# Patient Record
Sex: MALE | Race: WHITE | Employment: OTHER | URBAN - METROPOLITAN AREA
[De-identification: names, ages, dates, MRNs, and addresses within clinical notes are randomized per-mention and may not be internally consistent; named-entity substitution may affect disease eponyms.]

---

## 2017-12-06 ENCOUNTER — HOSPITAL ENCOUNTER (EMERGENCY)
Age: 67
Discharge: HOME | End: 2017-12-06
Attending: EMERGENCY MEDICINE
Payer: MEDICARE

## 2017-12-06 VITALS
OXYGEN SATURATION: 97 % | SYSTOLIC BLOOD PRESSURE: 147 MMHG | HEIGHT: 66 IN | HEART RATE: 78 BPM | TEMPERATURE: 98.4 F | WEIGHT: 181 LBS | RESPIRATION RATE: 20 BRPM | DIASTOLIC BLOOD PRESSURE: 89 MMHG | BODY MASS INDEX: 29.09 KG/M2

## 2017-12-06 DIAGNOSIS — B96.89 ACUTE BACTERIAL SINUSITIS: Primary | ICD-10-CM

## 2017-12-06 DIAGNOSIS — J01.90 ACUTE BACTERIAL SINUSITIS: Primary | ICD-10-CM

## 2017-12-06 PROCEDURE — 99283 EMERGENCY DEPT VISIT LOW MDM: CPT

## 2017-12-06 RX ORDER — IBUPROFEN 600 MG/1
600 TABLET ORAL EVERY 8 HOURS PRN
Qty: 20 TABLET | Refills: 0 | Status: SHIPPED | OUTPATIENT
Start: 2017-12-06

## 2017-12-06 RX ORDER — AZITHROMYCIN 250 MG/1
TABLET, FILM COATED ORAL
Qty: 1 PACKET | Refills: 0 | Status: SHIPPED | OUTPATIENT
Start: 2017-12-06 | End: 2017-12-16

## 2017-12-06 RX ORDER — FLUTICASONE PROPIONATE 50 MCG
1 SPRAY, SUSPENSION (ML) NASAL DAILY
Qty: 1 BOTTLE | Refills: 0 | Status: SHIPPED | OUTPATIENT
Start: 2017-12-06 | End: 2017-12-20

## 2017-12-06 RX ORDER — LORATADINE 10 MG/1
10 TABLET ORAL DAILY
Qty: 14 TABLET | Refills: 0 | Status: SHIPPED | OUTPATIENT
Start: 2017-12-06

## 2017-12-06 ASSESSMENT — ENCOUNTER SYMPTOMS
SORE THROAT: 0
SHORTNESS OF BREATH: 0
RHINORRHEA: 1
COUGH: 0
SINUS PAIN: 1

## 2017-12-06 ASSESSMENT — PAIN DESCRIPTION - PAIN TYPE: TYPE: ACUTE PAIN

## 2017-12-06 ASSESSMENT — PAIN DESCRIPTION - LOCATION: LOCATION: HEAD

## 2017-12-06 ASSESSMENT — PAIN SCALES - GENERAL: PAINLEVEL_OUTOF10: 6

## 2017-12-06 NOTE — ED TRIAGE NOTES
Pt presents to ED via private auto with c/o cough, sinus congestion/pressure. Respirations even and unlabored with clear lung sounds throughout all fields with green mucous production. Denies fever, chest pain, SOB, N/V/D, blurry vision and dizziness at this time. Pt is alert and oriented x 4 with no signs of distress noted.   Pt is resting on cot

## 2017-12-06 NOTE — ED PROVIDER NOTES
[address] [place] [address] ED  eMERGENCY dEPARTMENT eNCOUnter      Pt Name: Estefany Corrales  MRN: [id number]  [place] 1950  Date of evaluation: 12/6/2017  Provider: Yoan Perez NP    [address] [place] [address]       Chief Complaint   Patient presents with    Headache    Sinusitis    Nasal Congestion    Cough         HISTORY OF PRESENT ILLNESS  (Location/Symptom, Timing/Onset, Context/Setting, Quality, Duration, Modifying Factors, Severity.)   Estefany Corrales is a 78 y.o. male who presents to the emergency department by private auto for evaluation of sinus pain, headache, rhinorrhea and nasal congestion that started 3 days ago. Patient states his head hurts all over. His pain as a 6 out of 10 and describes a pressure in his head. He denies nausea, vomiting, visual changes or dizziness. Patient has been taking over-the-counter medicine for his symptoms. Nursing Notes were reviewed. PAST MEDICAL HISTORY     Past Medical History:   Diagnosis Date    Hyperlipidemia     Hypertension     Parkinson's disease ([facility] [address]          SURGICAL HISTORY       Past Surgical History:   Procedure Laterality Date    TESTICLE BIOPSY           CURRENT MEDICATIONS       Discharge Medication List as of 12/6/2017  6:33 PM      CONTINUE these medications which have NOT CHANGED    Details   ondansetron (ZOFRAN ODT) 4 MG disintegrating tablet Take 1 tablet by mouth every 8 hours as needed for Nausea or Vomiting, Disp-20 tablet, R-0      lisinopril (PRINIVIL;ZESTRIL) 2.5 MG tablet Take 2.5 mg by mouth daily. carbidopa-levodopa (SINEMET)  MG per tablet Take 1 tablet by mouth 3 times daily. simvastatin (ZOCOR) 20 MG tablet Take 20 mg by mouth nightly. finasteride (PROPECIA) 1 MG tablet Take 1 mg by mouth daily. ALLERGIES     Pcn [penicillins]    FAMILY HISTORY     No family history on file.        SOCIAL HISTORY       Social History     Social History    Marital status:      Spouse name: N/A    Number of children: N/A    Years of education: N/A     Social History Main Topics    Smoking status: Never Smoker    Smokeless tobacco: Not on file    Alcohol use No    Drug use: No    Sexual activity: Not on file     Other Topics Concern    Not on file     Social History Narrative    No narrative on file         REVIEW OF SYSTEMS    (2-9 systems for level 4, 10 or more for level 5)     Review of Systems   HENT: Positive for congestion, rhinorrhea and sinus pain. Negative for ear pain and sore throat. Respiratory: Negative for cough and shortness of breath. Cardiovascular: Negative for chest pain. All other systems reviewed and are negative. Except as noted above the remainder of the review of systems was reviewed and negative. PHYSICAL EXAM    (up to 7 for level 4, 8 or more for level 5)     ED Triage Vitals [12/06/17 1818]   BP Temp Temp src Pulse Resp SpO2 Height Weight   (!) 147/89 98.4 °F (36.9 °C) -- 78 20 97 % 5' 6\" (1.676 m) 181 lb (82.1 kg)       Physical Exam   Constitutional: He is oriented to person, place, and time. He appears well-developed and well-nourished. HENT:   Head: Normocephalic and atraumatic. Right Ear: Hearing, tympanic membrane, external ear and ear canal normal.   Left Ear: Hearing, tympanic membrane, external ear and ear canal normal.   Nose: Mucosal edema and rhinorrhea present. Right sinus exhibits frontal sinus tenderness. Left sinus exhibits frontal sinus tenderness. Mouth/Throat: Uvula is midline, oropharynx is clear and moist and mucous membranes are normal.   Eyes: Conjunctivae and EOM are normal. Pupils are equal, round, and reactive to light. Neck: Normal range of motion. Neck supple. Pulmonary/Chest: Effort normal and breath sounds normal.   Musculoskeletal: Normal range of motion. Lymphadenopathy:     He has no cervical adenopathy. Neurological: He is alert and oriented to person, place, and time. He has normal strength and normal reflexes.  No cranial nerve deficit or sensory deficit. Skin: Skin is warm, dry and intact. Psychiatric: He has a normal mood and affect. His speech is normal and behavior is normal. Judgment and thought content normal. Cognition and memory are normal.         DIAGNOSTIC RESULTS     EKG: All EKG's are interpreted by the Emergency Department Physician who either signs or Co-signs this chart in the absence of a cardiologist.        RADIOLOGY:   Non-plain film images such as CT, Ultrasound and MRI are read by the radiologist. Plain radiographic images are visualized and preliminarily interpreted by the emergency physician with the below findings:        Interpretation per the Radiologist below, if available at the time of this note:    No orders to display         ED BEDSIDE ULTRASOUND:   Performed by ED Physician - none    LABS:  Labs Reviewed - No data to display    All other labs were within normal range or not returned as of this dictation. EMERGENCY DEPARTMENT COURSE and DIFFERENTIAL DIAGNOSIS/MDM:   Patient was evaluated in conjunction with attending physician. Patient presents with nasal congestion, rhinorrhea and sinus pain. He is afebrile. Lung sounds are clear to auscultation. No chest pain or shortness of breath. No neurological deficits. He has a penicillin allergy. He'll be placed on Zithromax for treatment of sinusitis. Flonase, Claritin and Motrin for symptom management. Patient instructed to call number provided to schedule appointment with a primary care physician. Return ED if symptoms worsen. Vitals:    Vitals:    12/06/17 1818   BP: (!) 147/89   Pulse: 78   Resp: 20   Temp: 98.4 °F (36.9 °C)   SpO2: 97%   Weight: 181 lb (82.1 kg)   Height: 5' 6\" (1.676 m)         CONSULTS:  None    PROCEDURES:  Procedures    FINAL IMPRESSION      1.  Acute bacterial sinusitis          DISPOSITION/PLAN   DISPOSITION Decision to Discharge    PATIENT REFERRED TO:     Call [phone] to schedule appointment with a

## 2017-12-06 NOTE — ED PROVIDER NOTES
Attending Supervisory Note/Shared Visit   I have personally performed a face to face diagnostic evaluation on this patient. I have reviewed the mid-levels findings and agree.   History and Exam by me shows acute sinusitis      (Please note that portions of this note were completed with a voice recognition program.  Efforts were made to edit the dictations but occasionally words are mis-transcribed.)    Ronny Olivera MD  Attending Emergency Physician       Ronny Olivera MD  12/06/17 [id number]

## 2021-12-15 ENCOUNTER — APPOINTMENT (OUTPATIENT)
Dept: GENERAL RADIOLOGY | Age: 71
End: 2021-12-15
Payer: MEDICARE

## 2021-12-15 ENCOUNTER — HOSPITAL ENCOUNTER (EMERGENCY)
Age: 71
Discharge: HOME | End: 2021-12-15
Attending: EMERGENCY MEDICINE
Payer: MEDICARE

## 2021-12-15 VITALS
HEIGHT: 66 IN | SYSTOLIC BLOOD PRESSURE: 127 MMHG | DIASTOLIC BLOOD PRESSURE: 68 MMHG | BODY MASS INDEX: 33.75 KG/M2 | HEART RATE: 83 BPM | TEMPERATURE: 99.6 F | RESPIRATION RATE: 18 BRPM | WEIGHT: 210 LBS | OXYGEN SATURATION: 95 %

## 2021-12-15 DIAGNOSIS — Z91.89 AT INCREASED RISK OF EXPOSURE TO COVID-19 VIRUS: Primary | ICD-10-CM

## 2021-12-15 PROCEDURE — U0005 INFEC AGEN DETEC AMPLI PROBE: HCPCS

## 2021-12-15 PROCEDURE — U0003 INFECTIOUS AGENT DETECTION BY NUCLEIC ACID (DNA OR RNA); SEVERE ACUTE RESPIRATORY SYNDROME CORONAVIRUS 2 (SARS-COV-2) (CORONAVIRUS DISEASE [COVID-19]), AMPLIFIED PROBE TECHNIQUE, MAKING USE OF HIGH THROUGHPUT TECHNOLOGIES AS DESCRIBED BY CMS-2020-01-R: HCPCS

## 2021-12-15 PROCEDURE — 71045 X-RAY EXAM CHEST 1 VIEW: CPT

## 2021-12-15 PROCEDURE — 99284 EMERGENCY DEPT VISIT MOD MDM: CPT

## 2021-12-15 PROCEDURE — 6370000000 HC RX 637 (ALT 250 FOR IP): Performed by: PHYSICIAN ASSISTANT

## 2021-12-15 RX ORDER — ACETAMINOPHEN 500 MG
1000 TABLET ORAL ONCE
Status: COMPLETED | OUTPATIENT
Start: 2021-12-15 | End: 2021-12-15

## 2021-12-15 RX ADMIN — ACETAMINOPHEN 1000 MG: 500 TABLET ORAL at 15:28

## 2021-12-15 ASSESSMENT — PAIN SCALES - GENERAL
PAINLEVEL_OUTOF10: 3
PAINLEVEL_OUTOF10: 3

## 2021-12-15 NOTE — ED PROVIDER NOTES
[address] [place] [address] ED  [facility]      Pt Name: Candy Lemon  MRN: [id number]  [place] 1950  Date ofevaluation: 12/15/2021  Provider: Jovan South PA-C    CHIEF COMPLAINT       Chief Complaint   Patient presents with    Concern For COVID-19     Symptoms started Monday    Cough    Fever         HISTORY OF PRESENT ILLNESS  (Location/Symptom, Timing/Onset, Context/Setting, Quality, Duration, Modifying Factors, Severity.)   Candy Lemon is a 80 y.o. male who presents to the emergency department with cough for the last 2-3 days. Has pcp appointment tomorrow but they told him to come to ER for a covid test before coming to his appointment. No n/v/d. No definite alleviating or aggravating factors. Nursing Notes were reviewed. ALLERGIES     Metformin and Pcn [penicillins]    CURRENT MEDICATIONS       Previous Medications    CARBIDOPA-LEVODOPA (SINEMET)  MG PER TABLET    Take 1 tablet by mouth 3 times daily. FINASTERIDE (PROPECIA) 1 MG TABLET    Take 1 mg by mouth daily. FLUTICASONE (FLONASE) 50 MCG/ACT NASAL SPRAY    1 spray by Nasal route daily for 14 days    IBUPROFEN (ADVIL;MOTRIN) 600 MG TABLET    Take 1 tablet by mouth every 8 hours as needed for Pain    LISINOPRIL (PRINIVIL;ZESTRIL) 2.5 MG TABLET    Take 2.5 mg by mouth daily. LORATADINE (CLARITIN) 10 MG TABLET    Take 1 tablet by mouth daily    ONDANSETRON (ZOFRAN ODT) 4 MG DISINTEGRATING TABLET    Take 1 tablet by mouth every 8 hours as needed for Nausea or Vomiting    SIMVASTATIN (ZOCOR) 20 MG TABLET    Take 20 mg by mouth nightly. 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 No pertinent family history. No family status information on file. SOCIAL HISTORY      reports that he has never smoked. He does not have any smokeless tobacco history on file. He reports that he does not drink alcohol and does not use drugs. REVIEW OFSYSTEMS    (2-9 systems for level 4, 10 or more for level 5)   Review of Systems    Except as noted above the remainder of the review of systems was reviewed and negative. PHYSICAL EXAM    (up to 7 for level 4, 8 or more for level 5)     ED Triage Vitals [12/15/21 1425]   BP Temp Temp Source Pulse Resp SpO2 Height Weight   127/68 99.6 °F (37.6 °C) Oral 83 18 95 % 5' 6\" (1.676 m) 210 lb (95.3 kg)      Physical Exam  Constitutional:       Appearance: He is well-developed. HENT:      Head: Normocephalic and atraumatic. Cardiovascular:      Rate and Rhythm: Normal rate and regular rhythm. Pulmonary:      Effort: Pulmonary effort is normal.      Breath sounds: Normal breath sounds. Abdominal:      General: There is no distension. Palpations: Abdomen is soft. Tenderness: There is no abdominal tenderness. Musculoskeletal:         General: Normal range of motion. Cervical back: Normal range of motion and neck supple. Skin:     General: Skin is warm. Neurological:      Mental Status: He is alert and oriented to person, place, and time.    Psychiatric:         Behavior: Behavior normal.                 DIAGNOSTIC RESULTS     EKG: All EKG's are interpreted by the Emergency Department Physician who either signs or Co-signs this chart in the absence of a cardiologist.        RADIOLOGY:   Non-plain film images such as CT, Ultrasound and MRI are read by the radiologist. Plain radiographic images arevisualized and preliminarily interpreted by the emergency physician with the below findings:        Interpretation per the Radiologist below, if available at thetime of this note:          ED BEDSIDE ULTRASOUND:   Performed by ED Physician - none    LABS:  Labs Reviewed - No data to display    All other labs were within normal range or not returned as of this dictation. EMERGENCY DEPARTMENT COURSE and DIFFERENTIAL DIAGNOSIS/MDM:   Vitals:    Vitals:    12/15/21 1425   BP: 127/68   Pulse: 83   Resp: 18   Temp: 99.6 °F (37.6 °C)   TempSrc: Oral   SpO2: 95%   Weight: 210 lb (95.3 kg)   Height: 5' 6\" (1.676 m)     Xray is negative. pcr pending    Will dc home    The patient was evaluated during the global COVID-19 pandemic, and that diagnosis was suspected/considered upon their initial presentation. Their evaluation, treatment and testing was consistent with current guidelines for patients who present with complaints or symptoms that may be related to COVID-19. CONSULTS:  None    PROCEDURES:  Procedures        FINAL IMPRESSION      1. At increased risk of exposure to COVID-19 virus          DISPOSITION/PLAN   DISPOSITION Decision To Discharge 12/15/2021 04:06:36 PM      PATIENTREFERRED TO:   No follow-up provider specified.     DISCHARGE MEDICATIONS:     New Prescriptions    No medications on file           (Please note that portions of this note were completed with a voice recognition program.  Efforts were made to edit thedictations but occasionally words are mis-transcribed.)    KAI Busch PA-C  12/15/21 9044

## 2021-12-16 ENCOUNTER — CARE COORDINATION (OUTPATIENT)
Dept: CARE COORDINATION | Age: 71
End: 2021-12-16

## 2021-12-16 LAB
SARS-COV-2: ABNORMAL
SARS-COV-2: DETECTED
SOURCE: ABNORMAL

## 2021-12-16 NOTE — CARE COORDINATION
ACM follow up call for ED visit/COVID 19 monitoring to pt with c/o cough,fever,headache. COVID test Positive. Outreach call to follow up with patient for follow up ED visit/COVID monitoring, no answer and no vm setup. Attempted to call wife's number and phone disconnected. If no return call, ACM will attempt to contact again tomorrow.

## 2021-12-17 ENCOUNTER — CARE COORDINATION (OUTPATIENT)
Dept: CARE COORDINATION | Age: 71
End: 2021-12-17

## 2022-11-26 ENCOUNTER — HOSPITAL ENCOUNTER (EMERGENCY)
Age: 72
Discharge: HOME | End: 2022-11-26
Attending: EMERGENCY MEDICINE
Payer: MEDICARE

## 2022-11-26 ENCOUNTER — APPOINTMENT (OUTPATIENT)
Dept: CT IMAGING | Age: 72
End: 2022-11-26
Payer: MEDICARE

## 2022-11-26 VITALS
TEMPERATURE: 98.3 F | BODY MASS INDEX: 34.07 KG/M2 | SYSTOLIC BLOOD PRESSURE: 144 MMHG | RESPIRATION RATE: 14 BRPM | HEIGHT: 66 IN | OXYGEN SATURATION: 97 % | WEIGHT: 212 LBS | HEART RATE: 86 BPM | DIASTOLIC BLOOD PRESSURE: 94 MMHG

## 2022-11-26 DIAGNOSIS — R31.0 GROSS HEMATURIA: Primary | ICD-10-CM

## 2022-11-26 LAB
ABSOLUTE EOS #: 0.14 K/UL (ref 0–0.44)
ABSOLUTE IMMATURE GRANULOCYTE: 0.02 K/UL (ref 0–0.3)
ABSOLUTE LYMPH #: 1.73 K/UL (ref 1.1–3.7)
ABSOLUTE MONO #: 0.72 K/UL (ref 0.1–1.2)
ANION GAP SERPL CALCULATED.3IONS-SCNC: 13 MMOL/L (ref 9–17)
BACTERIA: ABNORMAL
BASOPHILS # BLD: 1 % (ref 0–2)
BASOPHILS ABSOLUTE: 0.04 K/UL (ref 0–0.2)
BUN BLDV-MCNC: 23 MG/DL (ref 8–23)
BUN/CREAT BLD: 17 (ref 9–20)
CALCIUM SERPL-MCNC: 8.8 MG/DL (ref 8.6–10.4)
CASTS UA: ABNORMAL /LPF
CASTS UA: ABNORMAL /LPF
CHLORIDE BLD-SCNC: 103 MMOL/L (ref 98–107)
CO2: 25 MMOL/L (ref 20–31)
CREAT SERPL-MCNC: 1.39 MG/DL (ref 0.7–1.2)
EOSINOPHILS RELATIVE PERCENT: 2 % (ref 1–4)
EPITHELIAL CELLS UA: ABNORMAL /HPF (ref 0–5)
GFR SERPL CREATININE-BSD FRML MDRD: 54 ML/MIN/1.73M2
GLUCOSE BLD-MCNC: 111 MG/DL (ref 70–99)
HCT VFR BLD CALC: 40 % (ref 40.7–50.3)
HEMOGLOBIN: 12.5 G/DL (ref 13–17)
IMMATURE GRANULOCYTES: 0 %
INR BLD: 1.1
LYMPHOCYTES # BLD: 30 % (ref 24–43)
MCH RBC QN AUTO: 28.6 PG (ref 25.2–33.5)
MCHC RBC AUTO-ENTMCNC: 31.3 G/DL (ref 28.4–34.8)
MCV RBC AUTO: 91.5 FL (ref 82.6–102.9)
MONOCYTES # BLD: 12 % (ref 3–12)
NRBC AUTOMATED: 0 PER 100 WBC
PARTIAL THROMBOPLASTIN TIME: 36.5 SEC (ref 23.9–33.8)
PDW BLD-RTO: 13.1 % (ref 11.8–14.4)
PLATELET # BLD: 204 K/UL (ref 138–453)
PMV BLD AUTO: 10.5 FL (ref 8.1–13.5)
POTASSIUM SERPL-SCNC: 3.6 MMOL/L (ref 3.7–5.3)
PROTHROMBIN TIME: 14 SEC (ref 11.5–14.2)
RBC # BLD: 4.37 M/UL (ref 4.21–5.77)
RBC UA: ABNORMAL /HPF (ref 0–2)
SEG NEUTROPHILS: 55 % (ref 36–65)
SEGMENTED NEUTROPHILS ABSOLUTE COUNT: 3.17 K/UL (ref 1.5–8.1)
SODIUM BLD-SCNC: 141 MMOL/L (ref 135–144)
WBC # BLD: 5.8 K/UL (ref 3.5–11.3)
WBC UA: ABNORMAL /HPF (ref 0–5)

## 2022-11-26 PROCEDURE — 85730 THROMBOPLASTIN TIME PARTIAL: CPT

## 2022-11-26 PROCEDURE — 74176 CT ABD & PELVIS W/O CONTRAST: CPT

## 2022-11-26 PROCEDURE — 99284 EMERGENCY DEPT VISIT MOD MDM: CPT

## 2022-11-26 PROCEDURE — 85025 COMPLETE CBC W/AUTO DIFF WBC: CPT

## 2022-11-26 PROCEDURE — 51798 US URINE CAPACITY MEASURE: CPT

## 2022-11-26 PROCEDURE — 87086 URINE CULTURE/COLONY COUNT: CPT

## 2022-11-26 PROCEDURE — 85610 PROTHROMBIN TIME: CPT

## 2022-11-26 PROCEDURE — 81001 URINALYSIS AUTO W/SCOPE: CPT

## 2022-11-26 PROCEDURE — 80048 BASIC METABOLIC PNL TOTAL CA: CPT

## 2022-11-26 RX ORDER — ACYCLOVIR 200 MG/1
CAPSULE ORAL
COMMUNITY
Start: 2022-10-13

## 2022-11-26 RX ORDER — ATORVASTATIN CALCIUM 80 MG/1
TABLET, FILM COATED ORAL
COMMUNITY
Start: 2022-11-13

## 2022-11-26 RX ORDER — GLIPIZIDE 5 MG/1
5 TABLET ORAL DAILY
COMMUNITY
Start: 2022-02-08

## 2022-11-26 RX ORDER — GEMFIBROZIL 600 MG/1
TABLET, FILM COATED ORAL
COMMUNITY
Start: 2022-11-14

## 2022-11-26 RX ORDER — ASPIRIN 81 MG/1
81 TABLET ORAL DAILY
COMMUNITY

## 2022-11-26 RX ORDER — GABAPENTIN 100 MG/1
100 CAPSULE ORAL 4 TIMES DAILY
COMMUNITY
Start: 2018-01-01

## 2022-11-26 RX ORDER — MECLIZINE HYDROCHLORIDE 25 MG/1
TABLET ORAL
COMMUNITY
Start: 2022-02-01

## 2022-11-26 RX ORDER — LISINOPRIL AND HYDROCHLOROTHIAZIDE 20; 12.5 MG/1; MG/1
TABLET ORAL
COMMUNITY

## 2022-11-26 RX ORDER — EZETIMIBE 10 MG/1
10 TABLET ORAL DAILY
COMMUNITY
Start: 2022-07-05

## 2022-11-26 ASSESSMENT — ENCOUNTER SYMPTOMS
NAUSEA: 0
EYE DISCHARGE: 0
EYE REDNESS: 0
VOMITING: 0
COUGH: 0
DIARRHEA: 0
SHORTNESS OF BREATH: 0
RHINORRHEA: 0
COLOR CHANGE: 0
SORE THROAT: 0

## 2022-11-26 ASSESSMENT — PAIN SCALES - GENERAL: PAINLEVEL_OUTOF10: 2

## 2022-11-26 ASSESSMENT — PAIN - FUNCTIONAL ASSESSMENT: PAIN_FUNCTIONAL_ASSESSMENT: 0-10

## 2022-11-27 LAB
BILIRUBIN URINE: NEGATIVE
COLOR: YELLOW
GLUCOSE URINE: NEGATIVE
KETONES, URINE: NEGATIVE
LEUKOCYTE ESTERASE, URINE: NEGATIVE
NITRITE, URINE: NEGATIVE
PH UA: 6 (ref 5–8)
PROTEIN UA: ABNORMAL
SPECIFIC GRAVITY UA: 1.03 (ref 1–1.03)
TURBIDITY: CLEAR
URINE HGB: ABNORMAL
UROBILINOGEN, URINE: NORMAL

## 2022-11-27 NOTE — ED PROVIDER NOTES
EMERGENCY DEPARTMENT ENCOUNTER    Pt Name: Maile Stroud  MRN: [id number]  [place] 1950  Date of evaluation: 11/26/22  CHIEF COMPLAINT       Chief Complaint   Patient presents with    Hematuria    Dysuria     HISTORY OF PRESENT ILLNESS   This is a 80-year-old male that presents with complaints of hematuria. Patient has a history of an enlarged prostate, intermittent episodes of herpes, states that when he went to the bathroom this evening he had severe hematuria. Patient denies any history of similar, states he does not take any blood thinners. Patient denies any abdominal pain has no nausea or vomiting, he is only here because his wife requested he come in. REVIEW OF SYSTEMS     Review of Systems   Constitutional:  Negative for chills and fever. HENT:  Negative for rhinorrhea and sore throat. Eyes:  Negative for discharge, redness and visual disturbance. Respiratory:  Negative for cough and shortness of breath. Cardiovascular:  Negative for chest pain, palpitations and leg swelling. Gastrointestinal:  Negative for diarrhea, nausea and vomiting. Genitourinary:  Positive for hematuria. Negative for dysuria, scrotal swelling and testicular pain. Musculoskeletal:  Negative for arthralgias, myalgias and neck pain. Skin:  Negative for color change and rash. Neurological:  Negative for seizures, weakness and headaches. Psychiatric/Behavioral:  Negative for hallucinations, self-injury and suicidal ideas.     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 Historical Med      glipiZIDE (GLUCOTROL) 5 MG tablet Take 5 mg by mouth dailyHistorical Med      meclizine (ANTIVERT) 25 MG tablet TAKE 1 TABLET BY MOUTH TWICE DAILY AS NEEDED FOR DIZZINESSHistorical Med      acyclovir (ZOVIRAX) 200 MG capsule TAKE 1 CAPSULE BY MOUTH THREE TIMES DAILY FOR 10 DAYSHistorical Med      aspirin 81 MG EC tablet Take 81 mg by mouth dailyHistorical Med      gemfibrozil (LOPID) 600 MG tablet TAKE 1 TABLET BY MOUTH TWICE DAILY IN THE MORNING AND BEFORE BEDTIMEHistorical Med      lisinopril-hydroCHLOROthiazide (PRINZIDE;ZESTORETIC) 20-12.5 MG per tablet lisinopril 20 mg-hydrochlorothiazide 12.5 mg tablet   Take 1 tablet every day by oral route. Historical Med      nystatin-triamcinolone (MYCOLOG II) 014000-9.2 UNIT/GM-% cream APPLY CREAM TOPICALLY IN THE MORNING AND IN THE EVENING (BEFORE BEDTIME, DO ALL THIS FOR 7 DAYS. APPLY TO IRRITATION BEHIND LEFT EAR.), Historical Med      fluticasone (FLONASE) 50 MCG/ACT nasal spray 1 spray by Nasal route daily for 14 days, Disp-1 Bottle, R-0Print      loratadine (CLARITIN) 10 MG tablet Take 1 tablet by mouth daily, Disp-14 tablet, R-0Print      ibuprofen (ADVIL;MOTRIN) 600 MG tablet Take 1 tablet by mouth every 8 hours as needed for Pain, Disp-20 tablet, R-0Print      finasteride (PROPECIA) 1 MG tablet Take 1 mg by mouth daily. ALLERGIES     is allergic to metformin and pcn [penicillins]. FAMILY HISTORY     has no family status information on file.       SOCIAL HISTORY       Social History     Tobacco Use    Smoking status: Never     Passive exposure: Past    Smokeless tobacco: Never   Substance Use Topics    Alcohol use: No    Drug use: No     PHYSICAL EXAM     INITIAL VITALS: BP (!) 144/94   Pulse 86   Temp 98.3 °F (36.8 °C) (Oral)   Resp 14   Ht 5' 6\" (1.676 m)   Wt 212 lb (96.2 kg)   SpO2 97%   BMI 34.22 kg/m²    Physical Exam  Constitutional:       Appearance: Normal appearance. He is well-developed. He is not ill-appearing or toxic-appearing. HENT:      Head: Normocephalic and atraumatic. Eyes:      Conjunctiva/sclera: Conjunctivae normal.      Pupils: Pupils are equal, round, and reactive to light. Neck:      Trachea: Trachea normal.   Cardiovascular:      Rate and Rhythm: Normal rate and regular rhythm. Heart sounds: S1 normal and S2 normal. No murmur heard. Pulmonary:      Effort: Pulmonary effort is normal. No accessory muscle usage or respiratory distress. Breath sounds: Normal breath sounds. Chest:      Chest wall: No deformity or tenderness. Abdominal:      General: Bowel sounds are normal. There is no distension or abdominal bruit. Palpations: Abdomen is not rigid. Tenderness: There is no abdominal tenderness. There is no guarding or rebound. Genitourinary:      Musculoskeletal:      Cervical back: Normal range of motion and neck supple. Skin:     General: Skin is warm. Findings: No rash. Neurological:      Mental Status: He is alert and oriented to person, place, and time. GCS: GCS eye subscore is 4. GCS verbal subscore is 5. GCS motor subscore is 6. Psychiatric:         Speech: Speech normal.       MEDICAL DECISION MAKIN-year-old male presents with complaints of hematuria, plan is bladder scan basic labs urinalysis and reevaluation. ED Course as of 22 1434   Sat 2022   2137 2. 3.3 cm abdominal aortic aneurysm. 3 year follow-up is recommended.  [MS]      ED Course User Index  [MS] Efraín Salmon DO        HEART SCORE: Not indicated All patient's question's and concerns were answered prior to disposition and patient and/or family expressed understanding and agreement of treatment plan. CRITICAL CARE:              NIH STROKE SCALE:            PROCEDURES:    Procedures    DIAGNOSTIC RESULTS   EKG:All EKG's are interpreted by the Emergency Department Physician who either signs or Co-signs this chart in the absence of a cardiologist.        RADIOLOGY:All plain film, CT, MRI, and formal ultrasound images (except ED bedside ultrasound) are read by the radiologist, see reports below, unless otherwisenoted in MDM or here. CT ABDOMEN PELVIS WO CONTRAST Additional Contrast? None   Final Result   1. No acute findings with no renal stone or hydronephrosis. 2. 3.3 cm abdominal aortic aneurysm. 3 year follow-up is recommended. 3. Prostatomegaly. 4. Colonic diverticulosis. 5. Hepatic steatosis. 6. Cholelithiasis. 7. Bilateral renal cysts. LABS: All lab results were reviewed by myself, and all abnormals are listed below.   Labs Reviewed   BASIC METABOLIC PANEL - Abnormal; Notable for the following components:       Result Value    Glucose 111 (*)     Creatinine 1.39 (*)     Est, Glom Filt Rate 54 (*)     Potassium 3.6 (*)     All other components within normal limits   CBC WITH AUTO DIFFERENTIAL - Abnormal; Notable for the following components:    Hemoglobin 12.5 (*)     Hematocrit 40.0 (*)     All other components within normal limits   APTT - Abnormal; Notable for the following components:    PTT 36.5 (*)     All other components within normal limits   URINALYSIS - Abnormal; Notable for the following components:    Urine Hgb 3+ (*)     Protein, UA TRACE (*)     All other components within normal limits   MICROSCOPIC URINALYSIS - Abnormal; Notable for the following components:    Bacteria, UA RARE (*)     All other components within normal limits   CULTURE, URINE   PROTIME-INR       EMERGENCY DEPARTMENTCOURSE:         Vitals:    Vitals:    11/26/22 1824   BP: (!) 144/94   Pulse: 86   Resp: 14   Temp: 98.3 °F (36.8 °C)   TempSrc: Oral   SpO2: 97%   Weight: 212 lb (96.2 kg)   Height: 5' 6\" (1.676 m)       The patient was given the following medications while in the emergency department:  No orders of the defined types were placed in this encounter. CONSULTS:  None    FINAL IMPRESSION      1. Gross hematuria          DISPOSITION/PLAN   DISPOSITION Decision To Discharge 11/26/2022 09:39:45 PM      PATIENT REFERRED TO:  Imtiaz Kohler MD  [address]  [phone]    Schedule an appointment as soon as possible for a visit     DISCHARGE MEDICATIONS:  Discharge Medication List as of 11/26/2022  9:42 PM        Fredy Kapoor MD  Attending Emergency Physician      The care is provided during an unprecedented national emergency due to the novel coronavirus, COVID 19. This note was created with the assistance of a speech-recognition program. While intending to generate a document that actually reflects the content of the visit, no guarantees can be provided that every mistake has been identified and corrected by editing.     Naveen Mora MD  11/27/22 5053

## 2022-11-27 NOTE — DISCHARGE INSTRUCTIONS
PLEASE RETURN TO THE EMERGENCY DEPARTMENT IMMEDIATELY for worsening symptoms, inability to urinate, worsening of blood in your urine, or if you develop any concerning symptoms such as: high fever not relieved by acetaminophen (Tylenol) and/or ibuprofen (Motrin / Advil), chills, shortness of breath, chest pain, feeling of your heart fluttering or racing, persistent nausea and/or vomiting, vomiting up blood, blood in your stool, loss of consciousness, numbness, weakness or tingling in the arms or legs or change in color of the extremities, changes in mental status, persistent headache, blurry vision, loss of bladder / bowel.

## 2022-11-28 LAB
CULTURE: NORMAL
SPECIMEN DESCRIPTION: NORMAL

## 2022-11-29 NOTE — ED PROVIDER NOTES
[address] [place] [address] ED  Emergency Department  Emergency Medicine     Care of Nayana Molina was assumed from previous provider and is being seen for Hematuria and Dysuria  . The patient's initial evaluation and plan have been discussed with the prior provider who initially evaluated the patient. EMERGENCY DEPARTMENT COURSE / MEDICAL DECISION MAKING:       MEDICATIONS GIVEN:  No orders of the defined types were placed in this encounter. LABS / RADIOLOGY:     Labs Reviewed   BASIC METABOLIC PANEL - Abnormal; Notable for the following components:       Result Value    Glucose 111 (*)     Creatinine 1.39 (*)     Est, Glom Filt Rate 54 (*)     Potassium 3.6 (*)     All other components within normal limits   CBC WITH AUTO DIFFERENTIAL - Abnormal; Notable for the following components:    Hemoglobin 12.5 (*)     Hematocrit 40.0 (*)     All other components within normal limits   APTT - Abnormal; Notable for the following components:    PTT 36.5 (*)     All other components within normal limits   URINALYSIS - Abnormal; Notable for the following components:    Urine Hgb 3+ (*)     Protein, UA TRACE (*)     All other components within normal limits   MICROSCOPIC URINALYSIS - Abnormal; Notable for the following components:    Bacteria, UA RARE (*)     All other components within normal limits   CULTURE, URINE   PROTIME-INR       CT ABDOMEN PELVIS WO CONTRAST Additional Contrast? None    Result Date: 11/26/2022  EXAMINATION: CT OF THE ABDOMEN AND PELVIS WITHOUT CONTRAST 11/26/2022 7:17 pm TECHNIQUE: CT of the abdomen and pelvis was performed without the administration of intravenous contrast. Multiplanar reformatted images are provided for review. Automated exposure control, iterative reconstruction, and/or weight based adjustment of the mA/kV was utilized to reduce the radiation dose to as low as reasonably achievable. COMPARISON: 12/18/2015.  HISTORY: ORDERING SYSTEM PROVIDED HISTORY: hematuria TECHNOLOGIST PROVIDED HISTORY: hematuria Decision Support Exception - unselect if not a suspected or confirmed emergency medical condition->Emergency Medical Condition (MA) FINDINGS: Lower Chest: There is mild dependent atelectasis in the lower lobes. There is no pleural effusion. Organs: There is diffuse decreased attenuation of the liver. The gallbladder contains a gallstone. There is also a Phrygian cap. The spleen, adrenal glands and pancreas are unremarkable. Bilateral renal cysts appear similar to the prior study. There is no ureteral stone or hydronephrosis. GI/Bowel: There is no bowel dilatation or bowel wall thickening. There are colonic diverticula. The appendix and stomach are unremarkable. Pelvis: The bladder appears normal for the prostate gland is enlarged measuring up to 5.5 cm. Peritoneum/Retroperitoneum: There are no enlarged lymph nodes. An infrarenal abdominal aortic aneurysm measures up to 3.3 cm. No fat stranding, free fluid, free air or focal fluid collection is identified. Bones/Soft Tissues: There are no destructive bone lesions. 1. No acute findings with no renal stone or hydronephrosis. 2. 3.3 cm abdominal aortic aneurysm. 3 year follow-up is recommended. 3. Prostatomegaly. 4. Colonic diverticulosis. 5. Hepatic steatosis. 6. Cholelithiasis. 7. Bilateral renal cysts. RECENT VITALS:     Temp: 98.3 °F (36.8 °C),  Heart Rate: 86, Resp: 14, BP: (!) 144/94, SpO2: 97 %    ED Course as of 11/29/22 0020   Sat Nov 26, 2022   2137 2. 3.3 cm abdominal aortic aneurysm. 3 year follow-up is recommended. [MS]      ED Course User Index  [MS] Karin Last DO       This patient is a 71 y.o. Male with gross hematuria and some dysuria. Signed out awaiting CT imaging. CT imaging with no acute process. A small 3.3 cm aneurysm discussed this with patient. Discussed the possibility of neoplasm with gross hematuria with no other findings.   Encouraged to follow-up with urologist.      Based on the low acuity of concerning symptoms and improvement of symptoms, patient will be discharged with follow up and prescription information listed in the Disposition section. Patient states they will follow-up with primary care physician and/or return to the emergency department should they experience a change or worsening of symptoms. Patient will be discharged with resources: summary of visit, instructions, follow-up information, prescriptions if necessary. Patient/ family instructed to read discharge paperwork. All of their questions and concerns were addressed. Suspicion for any acute life-threatening processes is low. Patient voices understanding of plan. 0Discharge     FINAL IMPRESSION:     1.  Gross hematuria        DISPOSITION:         DISPOSITION:  []  Discharge   []  Transfer -    []  Admission -     []  Against Medical Advice   []  Eloped   FOLLOW-UP: Patrick Farrar MD  [place] [address]  [phone]    Schedule an appointment as soon as possible for a visit      DISCHARGE MEDICATIONS: Discharge Medication List as of 11/26/2022  9:42 PM             Anna Godinez DO  Emergency Medicine       Anna Godinez DO  11/29/22 0020

## 2023-03-17 ENCOUNTER — OFFICE VISIT (OUTPATIENT)
Dept: DERMATOLOGY | Age: 73
End: 2023-03-17

## 2023-03-17 VITALS
WEIGHT: 219.2 LBS | SYSTOLIC BLOOD PRESSURE: 134 MMHG | HEIGHT: 66 IN | HEART RATE: 83 BPM | OXYGEN SATURATION: 94 % | BODY MASS INDEX: 35.23 KG/M2 | DIASTOLIC BLOOD PRESSURE: 89 MMHG | TEMPERATURE: 97.2 F

## 2023-03-17 DIAGNOSIS — D48.5 NEOPLASM OF UNCERTAIN BEHAVIOR OF SKIN: Primary | ICD-10-CM

## 2023-03-17 DIAGNOSIS — L82.1 SEBORRHEIC KERATOSES: ICD-10-CM

## 2023-03-17 DIAGNOSIS — L40.8 SEBOPSORIASIS: ICD-10-CM

## 2023-03-17 RX ORDER — KETOCONAZOLE 20 MG/G
CREAM TOPICAL
Qty: 60 G | Refills: 3 | Status: SHIPPED | OUTPATIENT
Start: 2023-03-17

## 2023-03-17 RX ORDER — GABAPENTIN 400 MG/1
CAPSULE ORAL
COMMUNITY
Start: 2023-01-20

## 2023-03-17 RX ORDER — KETOCONAZOLE 20 MG/ML
SHAMPOO TOPICAL
Qty: 120 ML | Refills: 2 | Status: SHIPPED | OUTPATIENT
Start: 2023-03-17

## 2023-03-17 RX ORDER — FLUOCINOLONE ACETONIDE 0.11 MG/ML
OIL AURICULAR (OTIC)
COMMUNITY
Start: 2023-02-07

## 2023-03-17 RX ORDER — FLUOCINONIDE TOPICAL SOLUTION USP, 0.05% 0.5 MG/ML
SOLUTION TOPICAL
Qty: 60 ML | Refills: 3 | Status: SHIPPED | OUTPATIENT
Start: 2023-03-17

## 2023-03-17 RX ORDER — TRIAMCINOLONE ACETONIDE 0.25 MG/G
CREAM TOPICAL
Qty: 80 G | Refills: 3 | Status: SHIPPED | OUTPATIENT
Start: 2023-03-17

## 2023-03-17 RX ORDER — LIDOCAINE HYDROCHLORIDE 10 MG/ML
5 INJECTION, SOLUTION INFILTRATION; PERINEURAL ONCE
Status: SHIPPED | OUTPATIENT
Start: 2023-03-17

## 2023-03-17 RX ORDER — FINASTERIDE 5 MG/1
TABLET, FILM COATED ORAL
COMMUNITY
Start: 2023-02-02

## 2023-03-17 NOTE — PATIENT INSTRUCTIONS
PURCHASE OVER THE COUNTER EUCERIN CREAM  - Discussed dry skin care with patient, which includes the following:  A)  Warm, short showers  B)  No scrubbing devices in shower  C)  Dove Sensitive Skin soap  D)  CeraVe cream immediately after showering, while skin is still damp  BIOPSY WOUND CARE    A biopsy is where a small piece of skin tissue is removed and examined by a pathologist.  When a biopsy is done, there is a small wound site that requires proper care to prevent infection and scarring. Some biopsies require sutures and their removal.    How to Care for Biopsy Wound    A.  Leave band-aid or dressing on for 24 hours. B. Wash two times a day with soap and water. C.  Let the wound air dry, then apply Vaseline ointment and cover with a Band-Aid       unless otherwise instructed by your provider. D. If there is slight discomfort, you may give acetaminophen or ibuprofen. When To Call the Doctor    Call the Dermatology Clinic or your doctor if any of the following occur:    A. Redness and swelling  B. Tenderness and warm to touch  C.  Drainage from wound  D. Fever    Biopsy Results    Biopsy results are usually available in 1-2 weeks. We provide biopsy results in letters or via [employer] for benign results or we will call for any concerning results. If you have not heard from our staff please call the office within 2 weeks. Please call our office with any concerns at [phone].

## 2023-03-17 NOTE — PROGRESS NOTES
Dermatology Patient Note  [facility]  [address] [address]  Dept: [phone]  Dept Fax: [phone]      VISITDATE: 3/17/2023   REFERRING PROVIDER: No ref. provider found      Sindy Mcgarry is a 71 y.o. male  who presents today in the office for:    New Patient (FBSE Pt states he has moles on back and chest family hx of skin cancer. Pt states he has eczema in and around the ears. For the outside of ear the ENT prescribed Nystat cream and for the inside Fluocin drops. Pt states he gets bad dandruff as well as a fungal infection in the folds of his skin )      HISTORY OF PRESENT ILLNESS:  As above. Pt states that fluocinolone drops have helped his ears. He c/o tender dermatitis in groin intertrigo and scaling/pruritic dermatitis on scalp and face. MEDICAL PROBLEMS:  There are no problems to display for this patient. CURRENT MEDICATIONS:   Current Outpatient Medications   Medication Sig Dispense Refill    diclofenac sodium (VOLTAREN) 1 % GEL Apply 2 g topically 4 times daily      finasteride (PROSCAR) 5 MG tablet TAKE 1 TABLET BY MOUTH ONCE DAILY FOR 90 DAYS      fluocinolone (DERMOTIC) 0.01 % OIL oil Apply 4 drops to each ear nightly for the next 10 nights.      gabapentin (NEURONTIN) 400 MG capsule       ketoconazole (NIZORAL) 2 % cream Apply to rash on face and groin, twice daily, as needed. Combine with triamcinolone cream. 60 g 3    triamcinolone (KENALOG) 0.025 % cream Apply to rash on face and groin, twice daily, as needed. Combine with ketoconazole cream. 80 g 3    fluocinonide (LIDEX) 0.05 % external solution Apply to scalp twice daily, as needed, for itching.  60 mL 3    ketoconazole (NIZORAL) 2 % shampoo Apply 3-4 times weekly to scalp, leave on for five minutes prior to washing off 120 mL 2    acyclovir (ZOVIRAX) 200 MG capsule TAKE 1 CAPSULE BY MOUTH THREE TIMES DAILY FOR 10 DAYS aspirin 81 MG EC tablet Take 81 mg by mouth daily      atorvastatin (LIPITOR) 80 MG tablet Take 1 tablet by mouth once daily      ezetimibe (ZETIA) 10 MG tablet Take 10 mg by mouth daily      gemfibrozil (LOPID) 600 MG tablet TAKE 1 TABLET BY MOUTH TWICE DAILY IN THE MORNING AND BEFORE BEDTIME      glipiZIDE (GLUCOTROL) 5 MG tablet Take 5 mg by mouth daily      lisinopril-hydroCHLOROthiazide (PRINZIDE;ZESTORETIC) 20-12.5 MG per tablet lisinopril 20 mg-hydrochlorothiazide 12.5 mg tablet   Take 1 tablet every day by oral route. meclizine (ANTIVERT) 25 MG tablet TAKE 1 TABLET BY MOUTH TWICE DAILY AS NEEDED FOR DIZZINESS      loratadine (CLARITIN) 10 MG tablet Take 1 tablet by mouth daily 14 tablet 0    ibuprofen (ADVIL;MOTRIN) 600 MG tablet Take 1 tablet by mouth every 8 hours as needed for Pain 20 tablet 0    finasteride (PROPECIA) 1 MG tablet Take 1 mg by mouth daily. fluticasone (FLONASE) 50 MCG/ACT nasal spray 1 spray by Nasal route daily for 14 days 1 Bottle 0     Current Facility-Administered Medications   Medication Dose Route Frequency Provider Last Rate Last Admin    lidocaine 1 % injection 5 mg  5 mg IntraDERmal Once Rexann Later, KAI           ALLERGIES:   Allergies   Allergen Reactions    Metformin      Other reaction(s): Dizziness  He feels it made his vertigo come back    Pcn [Penicillins]        SOCIAL HISTORY:  Social History     Tobacco Use    Smoking status: Never     Passive exposure: Past    Smokeless tobacco: Never   Substance Use Topics    Alcohol use: No       Pertinent ROS:  Review of Systems  Skin: Denies any new changing, growing or bleeding lesions or rashes except as described in the HPI   Constitutional: Denies fevers, chills, and malaise.     PHYSICAL EXAM:   /89 (Site: Right Upper Arm, Position: Sitting)   Pulse 83   Temp 97.2 °F (36.2 °C) (Temporal)   Ht 5' 6\" (1.676 m)   Wt 219 lb 3.2 oz (99.4 kg)   SpO2 94%   BMI 35.38 kg/m²     The patient is generally well appearing, well nourished, alert and conversational. Affect is normal.    Cutaneous Exam:  Physical Exam  Total body skin exam excluding external genitalia: head/face, neck, both arms, chest, back, abdomen, both legs, buttocks, digits and/or nails, was examined. Genital exam was deferred as patient denied having any lesions in this area. Complete visualization of scalp may be limited by hair density, length, and/or style  Psoriasiform plaques on elbows    Facial covering was removed during examination. Diagnoses/exam findings/medical history pertinent to this visit are listed below:    Assessment:   Diagnosis Orders   1. Neoplasm of uncertain behavior of skin  WI TANGENTIAL BIOPSY SKIN SINGLE LESION    Surgical Pathology    lidocaine 1 % injection 5 mg      2. Sebopsoriasis  ketoconazole (NIZORAL) 2 % cream    triamcinolone (KENALOG) 0.025 % cream    fluocinonide (LIDEX) 0.05 % external solution    ketoconazole (NIZORAL) 2 % shampoo      3. Seborrheic keratoses             Plan:  1. Neoplasm of uncertain behavior of skin  Shave Biopsy (Left upper ABD - r/o AN): The procedure and its risks were explained including but not limited to pain, bleeding, infection, permanent scar, permanent pigment alteration and need for an additional procedure. Consent to proceed with the procedure was obtained from the patient or the parent. After cleaning with alcohol the lesion was anesthetized with 1% lidocaine with epinephrine and was removed with a dermablade. Hemostasis was achieved with aluminum chloride and Vaseline and a bandage were applied.   - WI TANGENTIAL BIOPSY SKIN SINGLE LESION  - Surgical Pathology; Future  - lidocaine 1 % injection 5 mg    2. Sebopsoriasis  - chronic illness, responding to treatment but not yet at goal   - ketoconazole (NIZORAL) 2 % cream; Apply to rash on face and groin, twice daily, as needed.   Combine with triamcinolone cream.  Dispense: 60 g; Refill: 3  - triamcinolone (KENALOG) 0.025 % cream; Apply to rash on face and groin, twice daily, as needed. Combine with ketoconazole cream.  Dispense: 80 g; Refill: 3  - fluocinonide (LIDEX) 0.05 % external solution; Apply to scalp twice daily, as needed, for itching. Dispense: 60 mL; Refill: 3  - ketoconazole (NIZORAL) 2 % shampoo; Apply 3-4 times weekly to scalp, leave on for five minutes prior to washing off  Dispense: 120 mL; Refill: 2    3. Seborrheic keratoses  - reassurance and education       RTC 3M    Future Appointments   Date Time Provider Dayanara Betancourt   6/19/2023 11:15 AM Amy Lemos PA-C  derm [id number]         Patient Instructions   PURCHASE OVER THE COUNTER EUCERIN CREAM  - Discussed dry skin care with patient, which includes the following:  A)  Warm, short showers  B)  No scrubbing devices in shower  C)  Dove Sensitive Skin soap  D)  CeraVe cream immediately after showering, while skin is still damp  BIOPSY WOUND CARE    A biopsy is where a small piece of skin tissue is removed and examined by a pathologist.  When a biopsy is done, there is a small wound site that requires proper care to prevent infection and scarring. Some biopsies require sutures and their removal.    How to Care for Biopsy Wound    A.  Leave band-aid or dressing on for 24 hours. B. Wash two times a day with soap and water. C.  Let the wound air dry, then apply Vaseline ointment and cover with a Band-Aid       unless otherwise instructed by your provider. D. If there is slight discomfort, you may give acetaminophen or ibuprofen. When To Call the Doctor    Call the Dermatology Clinic or your doctor if any of the following occur:    A. Redness and swelling  B. Tenderness and warm to touch  C.  Drainage from wound  D. Fever    Biopsy Results    Biopsy results are usually available in 1-2 weeks. We provide biopsy results in letters or via [employer] for benign results or we will call for any concerning results.   If you have not heard from our staff please call the office within 2 weeks. Please call our office with any concerns at [phone].       Electronically signed by Khadra Parish PA-C on 3/17/23 at 10:47 AM EDT

## 2023-03-20 ENCOUNTER — NURSE ONLY (OUTPATIENT)
Dept: LAB | Age: 73
End: 2023-03-20

## 2023-03-23 NOTE — RESULT ENCOUNTER NOTE
We have received and reviewed your biopsy results which demonstrated benign skin lesion called a mildly dysplastic nevus. Mildly dysplastic nevi are mildly atypical under the microscope, but studies show that they are no more likely to turn into melanoma than are normal moles. People with multiple dysplastic nevi should be followed at least yearly with photographs for clinical monitoring. No further treatment of this lesion is needed at this time. Please follow up as scheduled. Please don't hesitate to call our office at [phone] with any questions about your biopsy results.

## 2024-02-06 ENCOUNTER — HOSPITAL ENCOUNTER (EMERGENCY)
Age: 74
Discharge: HOME | End: 2024-02-06
Attending: EMERGENCY MEDICINE
Payer: MEDICARE

## 2024-02-06 ENCOUNTER — APPOINTMENT (OUTPATIENT)
Dept: CT IMAGING | Age: 74
End: 2024-02-06
Payer: MEDICARE

## 2024-02-06 VITALS
SYSTOLIC BLOOD PRESSURE: 151 MMHG | RESPIRATION RATE: 20 BRPM | BODY MASS INDEX: 35.36 KG/M2 | OXYGEN SATURATION: 96 % | TEMPERATURE: 98 F | HEIGHT: 66 IN | WEIGHT: 220 LBS | HEART RATE: 68 BPM | DIASTOLIC BLOOD PRESSURE: 89 MMHG

## 2024-02-06 DIAGNOSIS — R51.9 ACUTE NONINTRACTABLE HEADACHE, UNSPECIFIED HEADACHE TYPE: Primary | ICD-10-CM

## 2024-02-06 LAB
ANION GAP SERPL CALCULATED.3IONS-SCNC: 12 MMOL/L (ref 9–17)
BASOPHILS # BLD: 0.04 K/UL (ref 0–0.2)
BASOPHILS NFR BLD: 1 % (ref 0–2)
BUN SERPL-MCNC: 27 MG/DL (ref 8–23)
BUN/CREAT SERPL: 21 (ref 9–20)
CALCIUM SERPL-MCNC: 9.5 MG/DL (ref 8.6–10.4)
CHLORIDE SERPL-SCNC: 99 MMOL/L (ref 98–107)
CO2 SERPL-SCNC: 28 MMOL/L (ref 20–31)
CREAT SERPL-MCNC: 1.3 MG/DL (ref 0.7–1.2)
CRP SERPL HS-MCNC: <3 MG/L (ref 0–5)
EOSINOPHIL # BLD: 0.16 K/UL (ref 0–0.44)
EOSINOPHILS RELATIVE PERCENT: 3 % (ref 1–4)
ERYTHROCYTE [DISTWIDTH] IN BLOOD BY AUTOMATED COUNT: 12.7 % (ref 11.8–14.4)
ERYTHROCYTE [SEDIMENTATION RATE] IN BLOOD BY PHOTOMETRIC METHOD: 35 MM/HR (ref 0–20)
FLUAV RNA RESP QL NAA+PROBE: NOT DETECTED
FLUBV RNA RESP QL NAA+PROBE: NOT DETECTED
GFR SERPL CREATININE-BSD FRML MDRD: 58 ML/MIN/1.73M2
GLUCOSE SERPL-MCNC: 142 MG/DL (ref 70–99)
HCT VFR BLD AUTO: 44.3 % (ref 40.7–50.3)
HGB BLD-MCNC: 14.2 G/DL (ref 13–17)
IMM GRANULOCYTES # BLD AUTO: 0.01 K/UL (ref 0–0.3)
IMM GRANULOCYTES NFR BLD: 0 %
LYMPHOCYTES NFR BLD: 2.15 K/UL (ref 1.1–3.7)
LYMPHOCYTES RELATIVE PERCENT: 33 % (ref 24–43)
MCH RBC QN AUTO: 29 PG (ref 25.2–33.5)
MCHC RBC AUTO-ENTMCNC: 32.1 G/DL (ref 28.4–34.8)
MCV RBC AUTO: 90.4 FL (ref 82.6–102.9)
MONOCYTES NFR BLD: 0.66 K/UL (ref 0.1–1.2)
MONOCYTES NFR BLD: 10 % (ref 3–12)
NEUTROPHILS NFR BLD: 53 % (ref 36–65)
NEUTS SEG NFR BLD: 3.43 K/UL (ref 1.5–8.1)
NRBC BLD-RTO: 0 PER 100 WBC
PLATELET # BLD AUTO: 173 K/UL (ref 138–453)
PMV BLD AUTO: 10.8 FL (ref 8.1–13.5)
POTASSIUM SERPL-SCNC: 3.8 MMOL/L (ref 3.7–5.3)
RBC # BLD AUTO: 4.9 M/UL (ref 4.21–5.77)
SARS-COV-2 RNA RESP QL NAA+PROBE: NOT DETECTED
SODIUM SERPL-SCNC: 139 MMOL/L (ref 135–144)
SOURCE: NORMAL
SPECIMEN DESCRIPTION: NORMAL
WBC OTHER # BLD: 6.5 K/UL (ref 3.5–11.3)

## 2024-02-06 PROCEDURE — 80048 BASIC METABOLIC PNL TOTAL CA: CPT

## 2024-02-06 PROCEDURE — 86140 C-REACTIVE PROTEIN: CPT

## 2024-02-06 PROCEDURE — 6360000002 HC RX W HCPCS: Performed by: EMERGENCY MEDICINE

## 2024-02-06 PROCEDURE — 87636 SARSCOV2 & INF A&B AMP PRB: CPT

## 2024-02-06 PROCEDURE — 99284 EMERGENCY DEPT VISIT MOD MDM: CPT

## 2024-02-06 PROCEDURE — 85652 RBC SED RATE AUTOMATED: CPT

## 2024-02-06 PROCEDURE — 70450 CT HEAD/BRAIN W/O DYE: CPT

## 2024-02-06 PROCEDURE — 85025 COMPLETE CBC W/AUTO DIFF WBC: CPT

## 2024-02-06 PROCEDURE — 96374 THER/PROPH/DIAG INJ IV PUSH: CPT

## 2024-02-06 RX ORDER — BUTALBITAL, ACETAMINOPHEN AND CAFFEINE 50; 325; 40 MG/1; MG/1; MG/1
1 TABLET ORAL EVERY 6 HOURS PRN
Qty: 30 TABLET | Refills: 0 | Status: SHIPPED | OUTPATIENT
Start: 2024-02-06

## 2024-02-06 RX ORDER — KETOROLAC TROMETHAMINE 30 MG/ML
30 INJECTION, SOLUTION INTRAMUSCULAR; INTRAVENOUS ONCE
Status: COMPLETED | OUTPATIENT
Start: 2024-02-06 | End: 2024-02-06

## 2024-02-06 RX ADMIN — KETOROLAC TROMETHAMINE 30 MG: 30 INJECTION INTRAMUSCULAR; INTRAVENOUS at 09:31

## 2024-02-06 ASSESSMENT — ENCOUNTER SYMPTOMS
DIARRHEA: 0
SHORTNESS OF BREATH: 0
VOMITING: 0
COLOR CHANGE: 0
COUGH: 0
EYE REDNESS: 0
SORE THROAT: 0
EYE DISCHARGE: 0
RHINORRHEA: 0
NAUSEA: 0

## 2024-02-06 ASSESSMENT — PAIN SCALES - GENERAL
PAINLEVEL_OUTOF10: 9
PAINLEVEL_OUTOF10: 9

## 2024-02-06 ASSESSMENT — PAIN - FUNCTIONAL ASSESSMENT: PAIN_FUNCTIONAL_ASSESSMENT: 0-10

## 2024-02-06 NOTE — ED NOTES
Pt to er with c/o headache. Pt states headache started yesterday. Pt denies n/v. Pt states pain is on left side of head and pain is intermittent. Pt states he has taken otc medications with no relief. Pt denies fever or chills. Pt a&ox3. Skin warm and dry. Respirations even and non-labored.

## 2024-02-06 NOTE — ED PROVIDER NOTES
EMERGENCY DEPARTMENT ENCOUNTER    Pt Name: Duane Ramsey  MRN: [id number]  Birthdate 1950  Date of evaluation: 2/6/24  CHIEF COMPLAINT       Chief Complaint   Patient presents with    Headache     Started yesterday afternoon, mostly left sided, no relief from OTCs      HISTORY OF PRESENT ILLNESS   73-year-old male presents with complaints of right-sided temporal headaches.  Patient's headache has been ongoing for about 24 hours.  No prior history of significant headaches.  He has tried over-the-counter pain medications without relief.  Denies any fevers or chills, no visual changes.           REVIEW OF SYSTEMS     Review of Systems   Constitutional:  Negative for chills and fever.   HENT:  Negative for rhinorrhea and sore throat.    Eyes:  Negative for discharge, redness and visual disturbance.   Respiratory:  Negative for cough and shortness of breath.    Cardiovascular:  Negative for chest pain, palpitations and leg swelling.   Gastrointestinal:  Negative for diarrhea, nausea and vomiting.   Genitourinary:  Negative for dysuria and hematuria.   Musculoskeletal:  Negative for arthralgias, myalgias and neck pain.   Skin:  Negative for color change and rash.   Neurological:  Positive for headaches. Negative for seizures and weakness.   Psychiatric/Behavioral:  Negative for hallucinations, self-injury and suicidal ideas.      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    SURGICAL HISTORY       Past Surgical History:   Procedure Laterality Date    TESTICLE BIOPSY       CURRENT MEDICATIONS       Previous Medications    ACYCLOVIR (ZOVIRAX) 200 MG CAPSULE    TAKE 1 CAPSULE BY MOUTH THREE TIMES DAILY FOR 10 DAYS    ASPIRIN 81 MG EC TABLET    Take 81 mg by mouth daily    ATORVASTATIN (LIPITOR) 80 MG TABLET    Take 1

## 2024-02-06 NOTE — ED TRIAGE NOTES
Pt came from home with family. States started having left sided headache, yesterday afternoon. Pt took OTC pain medication and sinus medication with no relief. Pt states headache is not getting better. Pt a/o x4. [place].   Call light given.   Pt changed into gown.   Family at bedside.

## 2024-03-06 ENCOUNTER — HOSPITAL ENCOUNTER (OUTPATIENT)
Dept: PREADMISSION TESTING | Age: 74
Discharge: HOME | End: 2024-03-06
Payer: MEDICARE

## 2024-03-06 ENCOUNTER — ANESTHESIA EVENT (OUTPATIENT)
Dept: OPERATING ROOM | Age: 74
End: 2024-03-06
Payer: MEDICARE

## 2024-03-06 VITALS
HEART RATE: 73 BPM | SYSTOLIC BLOOD PRESSURE: 130 MMHG | BODY MASS INDEX: 34.72 KG/M2 | OXYGEN SATURATION: 97 % | TEMPERATURE: 97.1 F | HEIGHT: 66 IN | WEIGHT: 216 LBS | DIASTOLIC BLOOD PRESSURE: 68 MMHG | RESPIRATION RATE: 16 BRPM

## 2024-03-06 LAB
ABO + RH BLD: NORMAL
ANION GAP SERPL CALCULATED.3IONS-SCNC: 12 MMOL/L (ref 9–17)
ARM BAND NUMBER: NORMAL
BLOOD BANK SAMPLE EXPIRATION: NORMAL
BLOOD GROUP ANTIBODIES SERPL: NEGATIVE
BUN SERPL-MCNC: 24 MG/DL (ref 8–23)
BUN/CREAT SERPL: 20 (ref 9–20)
CALCIUM SERPL-MCNC: 9.2 MG/DL (ref 8.6–10.4)
CHLORIDE SERPL-SCNC: 98 MMOL/L (ref 98–107)
CO2 SERPL-SCNC: 28 MMOL/L (ref 20–31)
CREAT SERPL-MCNC: 1.2 MG/DL (ref 0.7–1.2)
EKG ATRIAL RATE: 75 BPM
EKG P AXIS: 19 DEGREES
EKG P-R INTERVAL: 188 MS
EKG Q-T INTERVAL: 412 MS
EKG QRS DURATION: 86 MS
EKG QTC CALCULATION (BAZETT): 460 MS
EKG R AXIS: -12 DEGREES
EKG T AXIS: 32 DEGREES
EKG VENTRICULAR RATE: 75 BPM
ERYTHROCYTE [DISTWIDTH] IN BLOOD BY AUTOMATED COUNT: 12.8 % (ref 11.8–14.4)
EST. AVERAGE GLUCOSE BLD GHB EST-MCNC: 163 MG/DL
GFR SERPL CREATININE-BSD FRML MDRD: >60 ML/MIN/1.73M2
GLUCOSE SERPL-MCNC: 186 MG/DL (ref 70–99)
HBA1C MFR BLD: 7.3 % (ref 4–6)
HCT VFR BLD AUTO: 42.3 % (ref 40.7–50.3)
HGB BLD-MCNC: 13.6 G/DL (ref 13–17)
MCH RBC QN AUTO: 28.5 PG (ref 25.2–33.5)
MCHC RBC AUTO-ENTMCNC: 32.2 G/DL (ref 28.4–34.8)
MCV RBC AUTO: 88.5 FL (ref 82.6–102.9)
NRBC BLD-RTO: 0 PER 100 WBC
PLATELET # BLD AUTO: 181 K/UL (ref 138–453)
PMV BLD AUTO: 10.8 FL (ref 8.1–13.5)
POTASSIUM SERPL-SCNC: 3.8 MMOL/L (ref 3.7–5.3)
RBC # BLD AUTO: 4.78 M/UL (ref 4.21–5.77)
SODIUM SERPL-SCNC: 138 MMOL/L (ref 135–144)
WBC OTHER # BLD: 6.1 K/UL (ref 3.5–11.3)

## 2024-03-06 PROCEDURE — 93005 ELECTROCARDIOGRAM TRACING: CPT | Performed by: ANESTHESIOLOGY

## 2024-03-06 PROCEDURE — 86850 RBC ANTIBODY SCREEN: CPT

## 2024-03-06 PROCEDURE — 86901 BLOOD TYPING SEROLOGIC RH(D): CPT

## 2024-03-06 PROCEDURE — 86900 BLOOD TYPING SEROLOGIC ABO: CPT

## 2024-03-06 PROCEDURE — 80048 BASIC METABOLIC PNL TOTAL CA: CPT

## 2024-03-06 PROCEDURE — 83036 HEMOGLOBIN GLYCOSYLATED A1C: CPT

## 2024-03-06 PROCEDURE — 85027 COMPLETE CBC AUTOMATED: CPT

## 2024-03-06 PROCEDURE — 36415 COLL VENOUS BLD VENIPUNCTURE: CPT

## 2024-03-06 NOTE — H&P
History and Physical Service   [facility]    HISTORY AND PHYSICAL EXAMINATION            Date of Evaluation: 3/6/2024  Patient name:  Duane Ramsey  MRN:   [id number]  YOB: 1950  PCP:    Bruce Khan MD    History Obtained From:     Patient, medical records    History of Present Illness:     This is Duane Ramsey a 73 y.o. male who presents for a pre-admission testing appointment for an upcoming CYSTOSCOPY TRANSURETHRAL RESECTION PROSTATE by Panfilo Ugarte MD scheduled on 03/20/2024 at 1015 due to BPH with urinary obstruction. The patient's chief complaint is urinary hesitancy, frequency, and incomplete emptying which has worsened over the past 2 years. He denies hematuria or dysuria. Denies changes in bowel habits. Prior treatment includes Finasteride with minimal relief of symptoms. The patient was evaluated by Dr. Ugarte who recommended planned surgery. Denies recent falls and injuries.     Functional Capacity per pt:  1) Pt is able to walk 2 city blocks on level ground without SOB.  2) Pt is not able to climb 2 flights of stairs without SOB.  3) Pt is not able to walk up a hill for 1-2 city blocks without SOB.    Past Medical History:     Past Medical History:   Diagnosis Date    Anxiety and depression     Deviated septum     ENT says not bad enough for surgery at this time.    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

## 2024-03-06 NOTE — PRE-PROCEDURE INSTRUCTIONS
label on the day of surgery.    Showering Before Surgery:     You can play an important role in your own health by carefully washing before surgery. Shower the night before and the morning of surgery using the instructions below. If you are allergic to Chlorhexidine Gluconate (CHG) use antibacterial soap instead.    If you were given Chlorhexidine soap, please follow the instructions included with the soap to shower the night before and the morning of surgery.  If you were not given Chlorhexidine, please shower or bathe the morning of surgery using an antibacterial soap.  Please dress in clean clothing after showering.     General information about Chlorhexidine Gluconate (CHG)   * It should not be used on hair, face, ears, genital area or skin that is not intact.   * It should not be used if breast feeding.   * It should not be used if you allergic to CHG.   * See the bottle for additional information.    Do Not apply any powder, deodorant, lotion/cream/oil, perfume/aftershave, cosmetics, or alcohol-based skin or hair products after showering.    Do Not shave near the planned surgical site unless specifically instructed to.     1. Wash your hair using your normal shampoo and rinse.   2. Wash your face and genital area (privates) using your own shampoo and rinse.   3. Turn off the shower water and pour half of the bottle of CHG onto a clean washcloth.   4. Wash your body from neck down to toes. Pay special attention to your surgical site.   5. Leave the CHG on your skin for 5 minutes and rinse it off.   6. Pat dry with a clean towel, sleep in clean clothes and clean sheets.   7. Do not shave near the surgical site.   8. Repeat process the morning of surgery.    CHG may cause dry skin, but should not cause redness, rash, or intense itching. If an suspect an allergic reaction, use your own soap and shampoo for the morning of surgery and report reaction to the pre-operative nurse.      Additional Instructions:      1.  first 24 hours after your surgery if you receive anesthesia or medication.  If you do not have someone to stay with you, your procedure may be cancelled.  As a patient at [facility] you can expect quality medical and nursing care that is centered on you individual needs.  Our goal is to make your surgical experience as comfortable as possible.    Any questions about preparing for your surgery please call [phone].      ____________________________   ____________________________  Signature (Patient)                                 Signature (Nurse)                     Date

## 2024-03-20 ENCOUNTER — ANESTHESIA (OUTPATIENT)
Dept: OPERATING ROOM | Age: 74
End: 2024-03-20
Payer: MEDICARE

## 2024-03-20 ENCOUNTER — HOSPITAL ENCOUNTER (OUTPATIENT)
Age: 74
Setting detail: OBSERVATION
Discharge: HOME | End: 2024-03-21
Attending: UROLOGY | Admitting: UROLOGY
Payer: MEDICARE

## 2024-03-20 DIAGNOSIS — N13.8 BPH WITH URINARY OBSTRUCTION: ICD-10-CM

## 2024-03-20 DIAGNOSIS — N40.1 BPH WITH URINARY OBSTRUCTION: ICD-10-CM

## 2024-03-20 LAB — GLUCOSE BLD-MCNC: 151 MG/DL (ref 75–110)

## 2024-03-20 PROCEDURE — 7100000000 HC PACU RECOVERY - FIRST 15 MIN: Performed by: UROLOGY

## 2024-03-20 PROCEDURE — 2580000003 HC RX 258: Performed by: UROLOGY

## 2024-03-20 PROCEDURE — 2709999900 HC NON-CHARGEABLE SUPPLY: Performed by: UROLOGY

## 2024-03-20 PROCEDURE — 3600000002 HC SURGERY LEVEL 2 BASE: Performed by: UROLOGY

## 2024-03-20 PROCEDURE — 6370000000 HC RX 637 (ALT 250 FOR IP): Performed by: UROLOGY

## 2024-03-20 PROCEDURE — 2580000003 HC RX 258: Performed by: ANESTHESIOLOGY

## 2024-03-20 PROCEDURE — 6360000002 HC RX W HCPCS: Performed by: UROLOGY

## 2024-03-20 PROCEDURE — 3700000000 HC ANESTHESIA ATTENDED CARE: Performed by: UROLOGY

## 2024-03-20 PROCEDURE — 3600000012 HC SURGERY LEVEL 2 ADDTL 15MIN: Performed by: UROLOGY

## 2024-03-20 PROCEDURE — 6360000002 HC RX W HCPCS: Performed by: SPECIALIST

## 2024-03-20 PROCEDURE — 7100000001 HC PACU RECOVERY - ADDTL 15 MIN: Performed by: UROLOGY

## 2024-03-20 PROCEDURE — 88305 TISSUE EXAM BY PATHOLOGIST: CPT

## 2024-03-20 PROCEDURE — 88344 IMHCHEM/IMCYTCHM EA MLT ANTB: CPT

## 2024-03-20 PROCEDURE — 82947 ASSAY GLUCOSE BLOOD QUANT: CPT

## 2024-03-20 PROCEDURE — G0378 HOSPITAL OBSERVATION PER HR: HCPCS

## 2024-03-20 PROCEDURE — 3700000001 HC ADD 15 MINUTES (ANESTHESIA): Performed by: UROLOGY

## 2024-03-20 PROCEDURE — 2580000003 HC RX 258: Performed by: SPECIALIST

## 2024-03-20 PROCEDURE — 6360000002 HC RX W HCPCS: Performed by: ANESTHESIOLOGY

## 2024-03-20 PROCEDURE — 2500000003 HC RX 250 WO HCPCS: Performed by: SPECIALIST

## 2024-03-20 RX ORDER — SODIUM CHLORIDE, SODIUM LACTATE, POTASSIUM CHLORIDE, CALCIUM CHLORIDE 600; 310; 30; 20 MG/100ML; MG/100ML; MG/100ML; MG/100ML
INJECTION, SOLUTION INTRAVENOUS CONTINUOUS PRN
Status: DISCONTINUED | OUTPATIENT
Start: 2024-03-20 | End: 2024-03-20 | Stop reason: SDUPTHER

## 2024-03-20 RX ORDER — NALOXONE HYDROCHLORIDE 0.4 MG/ML
INJECTION, SOLUTION INTRAMUSCULAR; INTRAVENOUS; SUBCUTANEOUS PRN
Status: DISCONTINUED | OUTPATIENT
Start: 2024-03-20 | End: 2024-03-20 | Stop reason: HOSPADM

## 2024-03-20 RX ORDER — SODIUM CHLORIDE 0.9 % (FLUSH) 0.9 %
5-40 SYRINGE (ML) INJECTION EVERY 12 HOURS SCHEDULED
Status: DISCONTINUED | OUTPATIENT
Start: 2024-03-20 | End: 2024-03-21 | Stop reason: HOSPADM

## 2024-03-20 RX ORDER — TROSPIUM CHLORIDE 20 MG/1
20 TABLET, FILM COATED ORAL ONCE
Status: COMPLETED | OUTPATIENT
Start: 2024-03-20 | End: 2024-03-20

## 2024-03-20 RX ORDER — TROSPIUM CHLORIDE 20 MG/1
20 TABLET, FILM COATED ORAL
Status: DISCONTINUED | OUTPATIENT
Start: 2024-03-20 | End: 2024-03-21 | Stop reason: HOSPADM

## 2024-03-20 RX ORDER — EZETIMIBE 10 MG/1
10 TABLET ORAL DAILY
Status: DISCONTINUED | OUTPATIENT
Start: 2024-03-20 | End: 2024-03-21 | Stop reason: HOSPADM

## 2024-03-20 RX ORDER — PROPOFOL 10 MG/ML
INJECTION, EMULSION INTRAVENOUS PRN
Status: DISCONTINUED | OUTPATIENT
Start: 2024-03-20 | End: 2024-03-20 | Stop reason: SDUPTHER

## 2024-03-20 RX ORDER — SODIUM CHLORIDE 0.9 % (FLUSH) 0.9 %
5-40 SYRINGE (ML) INJECTION EVERY 12 HOURS SCHEDULED
Status: DISCONTINUED | OUTPATIENT
Start: 2024-03-20 | End: 2024-03-20 | Stop reason: HOSPADM

## 2024-03-20 RX ORDER — ONDANSETRON 4 MG/1
4 TABLET, ORALLY DISINTEGRATING ORAL EVERY 8 HOURS PRN
Status: DISCONTINUED | OUTPATIENT
Start: 2024-03-20 | End: 2024-03-21 | Stop reason: HOSPADM

## 2024-03-20 RX ORDER — POTASSIUM CHLORIDE 20 MEQ/1
40 TABLET, EXTENDED RELEASE ORAL PRN
Status: DISCONTINUED | OUTPATIENT
Start: 2024-03-20 | End: 2024-03-21 | Stop reason: HOSPADM

## 2024-03-20 RX ORDER — SODIUM CHLORIDE, SODIUM LACTATE, POTASSIUM CHLORIDE, CALCIUM CHLORIDE 600; 310; 30; 20 MG/100ML; MG/100ML; MG/100ML; MG/100ML
INJECTION, SOLUTION INTRAVENOUS CONTINUOUS
Status: DISCONTINUED | OUTPATIENT
Start: 2024-03-20 | End: 2024-03-20 | Stop reason: HOSPADM

## 2024-03-20 RX ORDER — ONDANSETRON 2 MG/ML
INJECTION INTRAMUSCULAR; INTRAVENOUS PRN
Status: DISCONTINUED | OUTPATIENT
Start: 2024-03-20 | End: 2024-03-20 | Stop reason: SDUPTHER

## 2024-03-20 RX ORDER — SODIUM CHLORIDE 9 MG/ML
INJECTION, SOLUTION INTRAVENOUS CONTINUOUS
Status: DISCONTINUED | OUTPATIENT
Start: 2024-03-20 | End: 2024-03-21 | Stop reason: HOSPADM

## 2024-03-20 RX ORDER — GEMFIBROZIL 600 MG/1
600 TABLET, FILM COATED ORAL
Status: DISCONTINUED | OUTPATIENT
Start: 2024-03-20 | End: 2024-03-21 | Stop reason: HOSPADM

## 2024-03-20 RX ORDER — SODIUM CHLORIDE 0.9 % (FLUSH) 0.9 %
5-40 SYRINGE (ML) INJECTION PRN
Status: DISCONTINUED | OUTPATIENT
Start: 2024-03-20 | End: 2024-03-21 | Stop reason: HOSPADM

## 2024-03-20 RX ORDER — HYDROMORPHONE HYDROCHLORIDE 1 MG/ML
0.5 INJECTION, SOLUTION INTRAMUSCULAR; INTRAVENOUS; SUBCUTANEOUS EVERY 5 MIN PRN
Status: COMPLETED | OUTPATIENT
Start: 2024-03-20 | End: 2024-03-20

## 2024-03-20 RX ORDER — LIDOCAINE HYDROCHLORIDE 20 MG/ML
INJECTION, SOLUTION EPIDURAL; INFILTRATION; INTRACAUDAL; PERINEURAL PRN
Status: DISCONTINUED | OUTPATIENT
Start: 2024-03-20 | End: 2024-03-20 | Stop reason: SDUPTHER

## 2024-03-20 RX ORDER — SODIUM CHLORIDE 0.9 % (FLUSH) 0.9 %
5-40 SYRINGE (ML) INJECTION PRN
Status: DISCONTINUED | OUTPATIENT
Start: 2024-03-20 | End: 2024-03-20 | Stop reason: HOSPADM

## 2024-03-20 RX ORDER — ONDANSETRON 2 MG/ML
4 INJECTION INTRAMUSCULAR; INTRAVENOUS
Status: COMPLETED | OUTPATIENT
Start: 2024-03-20 | End: 2024-03-20

## 2024-03-20 RX ORDER — SODIUM CHLORIDE 9 MG/ML
INJECTION, SOLUTION INTRAVENOUS PRN
Status: DISCONTINUED | OUTPATIENT
Start: 2024-03-20 | End: 2024-03-20 | Stop reason: HOSPADM

## 2024-03-20 RX ORDER — LISINOPRIL AND HYDROCHLOROTHIAZIDE 12.5; 1 MG/1; MG/1
1 TABLET ORAL DAILY
Status: DISCONTINUED | OUTPATIENT
Start: 2024-03-20 | End: 2024-03-21

## 2024-03-20 RX ORDER — ATORVASTATIN CALCIUM 80 MG/1
80 TABLET, FILM COATED ORAL DAILY
Status: DISCONTINUED | OUTPATIENT
Start: 2024-03-20 | End: 2024-03-21 | Stop reason: HOSPADM

## 2024-03-20 RX ORDER — PHENYLEPHRINE HCL IN 0.9% NACL 1 MG/10 ML
SYRINGE (ML) INTRAVENOUS PRN
Status: DISCONTINUED | OUTPATIENT
Start: 2024-03-20 | End: 2024-03-20 | Stop reason: SDUPTHER

## 2024-03-20 RX ORDER — GLIPIZIDE 5 MG/1
5 TABLET ORAL DAILY
Status: DISCONTINUED | OUTPATIENT
Start: 2024-03-20 | End: 2024-03-21 | Stop reason: HOSPADM

## 2024-03-20 RX ORDER — LIDOCAINE HYDROCHLORIDE 10 MG/ML
1 INJECTION, SOLUTION EPIDURAL; INFILTRATION; INTRACAUDAL; PERINEURAL
Status: DISCONTINUED | OUTPATIENT
Start: 2024-03-21 | End: 2024-03-20 | Stop reason: HOSPADM

## 2024-03-20 RX ORDER — SODIUM CHLORIDE 9 MG/ML
INJECTION, SOLUTION INTRAVENOUS PRN
Status: DISCONTINUED | OUTPATIENT
Start: 2024-03-20 | End: 2024-03-21 | Stop reason: HOSPADM

## 2024-03-20 RX ORDER — FENTANYL CITRATE 50 UG/ML
25 INJECTION, SOLUTION INTRAMUSCULAR; INTRAVENOUS EVERY 5 MIN PRN
Status: DISCONTINUED | OUTPATIENT
Start: 2024-03-20 | End: 2024-03-20 | Stop reason: HOSPADM

## 2024-03-20 RX ORDER — OXYCODONE HYDROCHLORIDE 5 MG/1
10 TABLET ORAL EVERY 4 HOURS PRN
Status: DISCONTINUED | OUTPATIENT
Start: 2024-03-20 | End: 2024-03-21 | Stop reason: HOSPADM

## 2024-03-20 RX ORDER — POLYETHYLENE GLYCOL 3350 17 G/17G
17 POWDER, FOR SOLUTION ORAL DAILY PRN
Status: DISCONTINUED | OUTPATIENT
Start: 2024-03-20 | End: 2024-03-21 | Stop reason: HOSPADM

## 2024-03-20 RX ORDER — POTASSIUM CHLORIDE 7.45 MG/ML
10 INJECTION INTRAVENOUS PRN
Status: DISCONTINUED | OUTPATIENT
Start: 2024-03-20 | End: 2024-03-21 | Stop reason: HOSPADM

## 2024-03-20 RX ORDER — ONDANSETRON 2 MG/ML
4 INJECTION INTRAMUSCULAR; INTRAVENOUS EVERY 6 HOURS PRN
Status: DISCONTINUED | OUTPATIENT
Start: 2024-03-20 | End: 2024-03-21 | Stop reason: HOSPADM

## 2024-03-20 RX ORDER — MAGNESIUM SULFATE IN WATER 40 MG/ML
2000 INJECTION, SOLUTION INTRAVENOUS PRN
Status: DISCONTINUED | OUTPATIENT
Start: 2024-03-20 | End: 2024-03-21 | Stop reason: HOSPADM

## 2024-03-20 RX ORDER — OXYCODONE HYDROCHLORIDE 5 MG/1
5 TABLET ORAL EVERY 4 HOURS PRN
Status: DISCONTINUED | OUTPATIENT
Start: 2024-03-20 | End: 2024-03-21 | Stop reason: HOSPADM

## 2024-03-20 RX ORDER — FENTANYL CITRATE 50 UG/ML
INJECTION, SOLUTION INTRAMUSCULAR; INTRAVENOUS PRN
Status: DISCONTINUED | OUTPATIENT
Start: 2024-03-20 | End: 2024-03-20 | Stop reason: SDUPTHER

## 2024-03-20 RX ORDER — LEVOFLOXACIN 5 MG/ML
500 INJECTION, SOLUTION INTRAVENOUS ONCE
Status: COMPLETED | OUTPATIENT
Start: 2024-03-20 | End: 2024-03-20

## 2024-03-20 RX ORDER — SODIUM CHLORIDE 9 MG/ML
INJECTION, SOLUTION INTRAVENOUS CONTINUOUS
Status: DISCONTINUED | OUTPATIENT
Start: 2024-03-20 | End: 2024-03-20

## 2024-03-20 RX ORDER — GABAPENTIN 400 MG/1
400 CAPSULE ORAL 3 TIMES DAILY
Status: DISCONTINUED | OUTPATIENT
Start: 2024-03-20 | End: 2024-03-21 | Stop reason: HOSPADM

## 2024-03-20 RX ADMIN — GEMFIBROZIL 600 MG: 600 TABLET, FILM COATED ORAL at 15:12

## 2024-03-20 RX ADMIN — GLIPIZIDE 5 MG: 5 TABLET ORAL at 15:10

## 2024-03-20 RX ADMIN — OXYCODONE HYDROCHLORIDE 10 MG: 5 TABLET ORAL at 15:09

## 2024-03-20 RX ADMIN — GABAPENTIN 400 MG: 400 CAPSULE ORAL at 15:11

## 2024-03-20 RX ADMIN — PROPOFOL 40 MG: 10 INJECTION, EMULSION INTRAVENOUS at 10:30

## 2024-03-20 RX ADMIN — LIDOCAINE HYDROCHLORIDE 60 MG: 20 INJECTION, SOLUTION EPIDURAL; INFILTRATION; INTRACAUDAL; PERINEURAL at 10:13

## 2024-03-20 RX ADMIN — LISINOPRIL AND HYDROCHLOROTHIAZIDE 1 TABLET: 12.5; 1 TABLET ORAL at 15:13

## 2024-03-20 RX ADMIN — ATORVASTATIN CALCIUM 80 MG: 80 TABLET, FILM COATED ORAL at 15:10

## 2024-03-20 RX ADMIN — SODIUM CHLORIDE, POTASSIUM CHLORIDE, SODIUM LACTATE AND CALCIUM CHLORIDE: 600; 310; 30; 20 INJECTION, SOLUTION INTRAVENOUS at 08:50

## 2024-03-20 RX ADMIN — PROPOFOL 120 MG: 10 INJECTION, EMULSION INTRAVENOUS at 10:13

## 2024-03-20 RX ADMIN — SODIUM CHLORIDE, POTASSIUM CHLORIDE, SODIUM LACTATE AND CALCIUM CHLORIDE: 600; 310; 30; 20 INJECTION, SOLUTION INTRAVENOUS at 08:51

## 2024-03-20 RX ADMIN — Medication 100 MCG: at 10:58

## 2024-03-20 RX ADMIN — GABAPENTIN 400 MG: 400 CAPSULE ORAL at 21:46

## 2024-03-20 RX ADMIN — HYDROMORPHONE HYDROCHLORIDE 0.5 MG: 1 INJECTION, SOLUTION INTRAMUSCULAR; INTRAVENOUS; SUBCUTANEOUS at 11:37

## 2024-03-20 RX ADMIN — ONDANSETRON 4 MG: 2 INJECTION INTRAMUSCULAR; INTRAVENOUS at 10:58

## 2024-03-20 RX ADMIN — FENTANYL CITRATE 50 MCG: 50 INJECTION INTRAMUSCULAR; INTRAVENOUS at 10:52

## 2024-03-20 RX ADMIN — ONDANSETRON 4 MG: 2 INJECTION INTRAMUSCULAR; INTRAVENOUS at 13:08

## 2024-03-20 RX ADMIN — FENTANYL CITRATE 50 MCG: 50 INJECTION INTRAMUSCULAR; INTRAVENOUS at 10:24

## 2024-03-20 RX ADMIN — TROSPIUM CHLORIDE 20 MG: 20 TABLET, FILM COATED ORAL at 12:37

## 2024-03-20 RX ADMIN — HYDROMORPHONE HYDROCHLORIDE 0.5 MG: 1 INJECTION, SOLUTION INTRAMUSCULAR; INTRAVENOUS; SUBCUTANEOUS at 11:47

## 2024-03-20 RX ADMIN — Medication 100 MCG: at 11:05

## 2024-03-20 RX ADMIN — SODIUM CHLORIDE: 9 INJECTION, SOLUTION INTRAVENOUS at 15:27

## 2024-03-20 RX ADMIN — LEVOFLOXACIN 500 MG: 5 INJECTION, SOLUTION INTRAVENOUS at 10:20

## 2024-03-20 RX ADMIN — EZETIMIBE 10 MG: 10 TABLET ORAL at 15:12

## 2024-03-20 RX ADMIN — TROSPIUM CHLORIDE 20 MG: 20 TABLET, FILM COATED ORAL at 15:19

## 2024-03-20 RX ADMIN — HYDROMORPHONE HYDROCHLORIDE 0.5 MG: 1 INJECTION, SOLUTION INTRAMUSCULAR; INTRAVENOUS; SUBCUTANEOUS at 11:52

## 2024-03-20 RX ADMIN — ONDANSETRON 4 MG: 2 INJECTION INTRAMUSCULAR; INTRAVENOUS at 14:46

## 2024-03-20 RX ADMIN — FENTANYL CITRATE 25 MCG: 50 INJECTION INTRAMUSCULAR; INTRAVENOUS at 11:58

## 2024-03-20 RX ADMIN — HYDROMORPHONE HYDROCHLORIDE 0.5 MG: 1 INJECTION, SOLUTION INTRAMUSCULAR; INTRAVENOUS; SUBCUTANEOUS at 11:42

## 2024-03-20 RX ADMIN — FENTANYL CITRATE 25 MCG: 50 INJECTION INTRAMUSCULAR; INTRAVENOUS at 12:08

## 2024-03-20 ASSESSMENT — PAIN DESCRIPTION - DESCRIPTORS
DESCRIPTORS: PATIENT UNABLE TO DESCRIBE
DESCRIPTORS: SORE

## 2024-03-20 ASSESSMENT — PAIN SCALES - GENERAL
PAINLEVEL_OUTOF10: 10
PAINLEVEL_OUTOF10: 8
PAINLEVEL_OUTOF10: 8
PAINLEVEL_OUTOF10: 10
PAINLEVEL_OUTOF10: 6
PAINLEVEL_OUTOF10: 2
PAINLEVEL_OUTOF10: 0

## 2024-03-20 ASSESSMENT — PAIN DESCRIPTION - LOCATION
LOCATION: PENIS

## 2024-03-20 ASSESSMENT — PAIN DESCRIPTION - PAIN TYPE
TYPE: SURGICAL PAIN

## 2024-03-20 ASSESSMENT — PAIN DESCRIPTION - FREQUENCY
FREQUENCY: CONTINUOUS

## 2024-03-20 ASSESSMENT — PAIN - FUNCTIONAL ASSESSMENT: PAIN_FUNCTIONAL_ASSESSMENT: FACE, LEGS, ACTIVITY, CRY, AND CONSOLABILITY (FLACC)

## 2024-03-20 NOTE — CARE COORDINATION
Case Management Assessment  Initial Evaluation    Date/Time of Evaluation: 3/20/2024 4:12 PM  Assessment Completed by: Marisa Alaniz RN    If patient is discharged prior to next notation, then this note serves as note for discharge by case management.    Patient Name: Duane Ramsey                   YOB: 1950  Diagnosis: BPH with urinary obstruction [N40.1, N13.8]                   Date / Time: 3/20/2024  7:53 AM    Patient Admission Status: Observation   Readmission Risk (Low < 19, Mod (19-27), High > 27): No data recorded  Current PCP: Bruce Khan MD  PCP verified by CM? Yes    Chart Reviewed: Yes      History Provided by: Patient  Patient Orientation: Alert and Oriented    Patient Cognition: Alert    Hospitalization in the last 30 days (Readmission):  Yes    If yes, Readmission Assessment in  Navigator will be completed.    Advance Directives:      Code Status: Full Code   Patient's Primary Decision Maker is: Legal Next of Kin      Discharge Planning:    Patient lives with: Spouse/Significant Other Type of Home: Apartment  Primary Care Giver: Self  Patient Support Systems include: Spouse/Significant Other   Current Financial resources: Medicare  Current community resources: None  Current services prior to admission: Durable Medical Equipment            Current DME: Cane, Walker            Type of Home Care services:  None    ADLS  Prior functional level: Independent in ADLs/IADLs  Current functional level: Independent in ADLs/IADLs    PT AM-PAC:   /24  OT AM-PAC:   /24    Family can provide assistance at DC: Yes  Would you like Case Management to discuss the discharge plan with any other family members/significant others, and if so, who?    Plans to Return to Present Housing: Yes  Other Identified Issues/Barriers to RETURNING to current housing: none  Potential Assistance needed at discharge: N/A            Potential DME:    Patient expects to discharge to: Apartment  Plan for transportation

## 2024-03-20 NOTE — PROGRESS NOTES
Pt admitted to room 2003. Oriented to room, call light and bed mechanics. Side rails up x2. Call light within reach. Orders reviewed.    His wife is at the bedside

## 2024-03-20 NOTE — OP NOTE
Operative Note      Patient: Duane Ramsey  YOB: 1950  MRN: [id number]    Date of Procedure: 3/20/2024    Pre-Op Diagnosis Codes:     * BPH with urinary obstruction [N40.1, N13.8]    Post-Op Diagnosis: Same       Procedure(s):  CYSTOSCOPY TRANSURETHRAL RESECTION PROSTATE    Surgeon(s):  Panfilo Urena MD    Assistant:   * No surgical staff found *    Anesthesia: General    Estimated Blood Loss (mL): Minimal    Complications: None    Specimens:   ID Type Source Tests Collected by Time Destination   A : PROSTATE CHIPS Tissue Prostate SURGICAL PATHOLOGY Panfilo Urena MD 3/20/2024 1027        Implants:  * No implants in log *      Drains:   Urinary Catheter 03/20/24 3 Way (Active)       Findings: BPH        Detailed Description of Procedure:   Duane has BPH.  He has obstructive symptoms and urge incontinence despite taking finasteride.  He presents for TURP.  He was given general anesthesia and put in dorsolithotomy position.  His genitals were prepped in the usual sterile fashion.  I had to dilate the meatus to 30 Spanish with the Eden sound to get the 26 Spanish continuous-flow Storz resectoscope into the bladder.  The prostate was imaged.  The median lobe was taken down with the cutting loop from bladder neck to verumontanum creating a trough.  Next the lateral lobes were resected from 12-6 o'clock.  Depth of resection was out to capsular fibers.  Additional anterior tissue was resected.  The prostatic fossa was widely patent.  The Ellik evacuator was used to remove the specimen.  The prostatic fossa was extensively cauterized with good hemostasis.  A 24 Spanish three-way Fernandes catheter was placed.  He tolerated it well.    Electronically signed by Panfilo URENA MD on 3/20/2024 at 11:37 AM

## 2024-03-20 NOTE — ANESTHESIA POSTPROCEDURE EVALUATION
Department of Anesthesiology  Postprocedure Note    Patient: Duane Ramsey  MRN: [id number]  YOB: 1950  Date of evaluation: 3/20/2024    Procedure Summary       Date: 03/20/24 Room / Location: [facility]    Anesthesia Start: 1009 Anesthesia Stop: 1137    Procedure: CYSTOSCOPY TRANSURETHRAL RESECTION PROSTATE Diagnosis:       BPH with urinary obstruction      (BPH with urinary obstruction [N40.1, N13.8])    Surgeons: Panfilo Ugarte MD Responsible Provider: Zackery Marquez DO    Anesthesia Type: general ASA Status: 3            Anesthesia Type: No value filed.    Hazel Phase I: Hazel Score: 8    Hazel Phase II:      Anesthesia Post Evaluation    Patient location during evaluation: PACU  Patient participation: complete - patient participated  Level of consciousness: awake and alert  Airway patency: patent  Nausea & Vomiting: no nausea and no vomiting  Cardiovascular status: hemodynamically stable  Respiratory status: acceptable  Hydration status: euvolemic  Pain management: adequate    No notable events documented.

## 2024-03-20 NOTE — ANESTHESIA PRE PROCEDURE
Department of Anesthesiology  Preprocedure Note       Name:  Duane Ramsey   Age:  73 y.o.  :  1950                                          MRN:  [id number]         Date:  3/20/2024      Surgeon: Surgeon(s):  Panfilo Ugarte MD    Procedure: Procedure(s):  CYSTOSCOPY TRANSURETHRAL RESECTION PROSTATE    Medications prior to admission:   Prior to Admission medications    Medication Sig Start Date End Date Taking? Authorizing Provider   hydrocortisone 2.5 % cream Apply 1 Application topically 2 times daily Apply topically 2 times daily.  Patient not taking: Reported on 3/20/2024    Johanny Sampson MD   butalbital-acetaminophen-caffeine (FIORICET, ESGIC) -40 MG per tablet Take 1 tablet by mouth every 6 hours as needed for Headaches 24   Israel Brush MD   diclofenac sodium (VOLTAREN) 1 % GEL Apply 2 g topically 4 times daily  Patient not taking: Reported on 3/6/2024 12/12/22   Johanny Sampson MD   gabapentin (NEURONTIN) 400 MG capsule  23   Johanny Sampson MD   ketoconazole (NIZORAL) 2 % cream Apply to rash on face and groin, twice daily, as needed.  Combine with triamcinolone cream.  Patient not taking: Reported on 3/20/2024 3/17/23   Rico Chaparro PA-C   triamcinolone (KENALOG) 0.025 % cream Apply to rash on face and groin, twice daily, as needed.  Combine with ketoconazole cream.  Patient not taking: Reported on 3/20/2024 3/17/23   Rico Chaparro PA-C   fluocinonide (LIDEX) 0.05 % external solution Apply to scalp twice daily, as needed, for itching. 3/17/23   Rico Chaparro PA-C   ketoconazole (NIZORAL) 2 % shampoo Apply 3-4 times weekly to scalp, leave on for five minutes prior to washing off  Patient not taking: Reported on 3/20/2024 3/17/23   Rico Chaparro PA-C   aspirin 81 MG EC tablet Take 1 tablet by mouth daily    Johanny Sampson MD   atorvastatin (LIPITOR) 80 MG tablet Take 1 tablet by mouth once daily 22   Johanny Sampson MD   ezetimibe (ZETIA)

## 2024-03-20 NOTE — H&P
History and Physical Update    Pt Name: Duane Ramsey  MRN: [id number]  YOB: 1950  Date of evaluation: 3/20/2024      [x] I have reviewed the H&P found in Epic by Ale Roldan CNP dated 3/06/2024 used for an Interval History and Physical note.     [x] I have examined Duane Ramsey a 73 y.o., male who is scheduled for a CYSTOSCOPY TRANSURETHRAL RESECTION PROSTATE by Dr. Ugarte, Panfilo MCNEAL MD due to BPH with urinary obstruction. The patient denies health changes since his appointment with Ale Roldan CNP on 3/06/2024. Pt denies fever, chills, productive cough, SOB, chest pain, open sores, rashes, and wounds. Known diabetes. Pt's POC blood glucose today is 151 mg/dL. Motrin was last taken over 1 week ago. Aspirin was last taken on 3/12/2024 per the pt's wife. Pt is a poor historian/forgetful. Pt's wife is at bedside and helped answer questions.      Vital signs: BP (!) 132/99   Pulse 82   Temp 97.3 °F (36.3 °C)   Resp 18   Ht 1.676 m (5' 6\")   Wt 98 kg (216 lb)   SpO2 94%   BMI 34.86 kg/m²      Allergies:  Metformin and Pcn [penicillins]    Past medical history, surgical history, social history, and family history were reviewed and updated in EPIC as indicated.     Medications:    Prior to Admission medications    Medication Sig Start Date End Date Taking? Authorizing Provider   hydrocortisone 2.5 % cream Apply 1 Application topically 2 times daily Apply topically 2 times daily.  Patient not taking: Reported on 3/20/2024    Johanny Sampson MD   butalbital-acetaminophen-caffeine (FIORICET, ESGIC) -40 MG per tablet Take 1 tablet by mouth every 6 hours as needed for Headaches 2/6/24   Israel Brush MD   diclofenac sodium (VOLTAREN) 1 % GEL Apply 2 g topically 4 times daily  Patient not taking: Reported on 3/6/2024 12/12/22   Johanny Sampson MD   gabapentin (NEURONTIN) 400 MG capsule  1/20/23   Johanny Sampson MD   ketoconazole (NIZORAL) 2 % cream Apply to rash on face and

## 2024-03-21 VITALS
HEIGHT: 66 IN | RESPIRATION RATE: 18 BRPM | BODY MASS INDEX: 35.36 KG/M2 | DIASTOLIC BLOOD PRESSURE: 72 MMHG | TEMPERATURE: 98.8 F | OXYGEN SATURATION: 95 % | WEIGHT: 220 LBS | SYSTOLIC BLOOD PRESSURE: 121 MMHG | HEART RATE: 82 BPM

## 2024-03-21 LAB — GLUCOSE BLD-MCNC: 98 MG/DL (ref 75–110)

## 2024-03-21 PROCEDURE — G0378 HOSPITAL OBSERVATION PER HR: HCPCS

## 2024-03-21 PROCEDURE — 82947 ASSAY GLUCOSE BLOOD QUANT: CPT

## 2024-03-21 PROCEDURE — 51700 IRRIGATION OF BLADDER: CPT

## 2024-03-21 PROCEDURE — 2580000003 HC RX 258: Performed by: UROLOGY

## 2024-03-21 PROCEDURE — 6370000000 HC RX 637 (ALT 250 FOR IP): Performed by: UROLOGY

## 2024-03-21 RX ORDER — HYDROCHLOROTHIAZIDE 12.5 MG/1
12.5 TABLET ORAL DAILY
Status: DISCONTINUED | OUTPATIENT
Start: 2024-03-21 | End: 2024-03-21 | Stop reason: HOSPADM

## 2024-03-21 RX ORDER — LISINOPRIL 10 MG/1
10 TABLET ORAL DAILY
Status: DISCONTINUED | OUTPATIENT
Start: 2024-03-21 | End: 2024-03-21 | Stop reason: HOSPADM

## 2024-03-21 RX ADMIN — SODIUM CHLORIDE: 9 INJECTION, SOLUTION INTRAVENOUS at 07:28

## 2024-03-21 RX ADMIN — GABAPENTIN 400 MG: 400 CAPSULE ORAL at 08:04

## 2024-03-21 RX ADMIN — GEMFIBROZIL 600 MG: 600 TABLET, FILM COATED ORAL at 07:29

## 2024-03-21 RX ADMIN — EZETIMIBE 10 MG: 10 TABLET ORAL at 08:04

## 2024-03-21 RX ADMIN — GLIPIZIDE 5 MG: 5 TABLET ORAL at 08:05

## 2024-03-21 RX ADMIN — ATORVASTATIN CALCIUM 80 MG: 80 TABLET, FILM COATED ORAL at 08:03

## 2024-03-21 RX ADMIN — TROSPIUM CHLORIDE 20 MG: 20 TABLET, FILM COATED ORAL at 05:56

## 2024-03-21 ASSESSMENT — PAIN DESCRIPTION - FREQUENCY: FREQUENCY: CONTINUOUS

## 2024-03-21 ASSESSMENT — PAIN DESCRIPTION - LOCATION: LOCATION: PENIS

## 2024-03-21 ASSESSMENT — PAIN DESCRIPTION - DESCRIPTORS: DESCRIPTORS: SORE

## 2024-03-21 ASSESSMENT — PAIN SCALES - GENERAL: PAINLEVEL_OUTOF10: 2

## 2024-03-21 ASSESSMENT — PAIN DESCRIPTION - PAIN TYPE: TYPE: SURGICAL PAIN

## 2024-03-21 NOTE — CARE COORDINATION
Discharge Report    [facility]  Clinical Case Management Department  Written by: Marisa Alaniz RN    Patient Name: Duane Ramsey  Attending Provider: Panfilo Ugarte MD  Admit Date: 3/20/2024  7:53 AM  MRN: [id number]  Account: [id number]                     : 1950  Discharge Date: 3/21/24      Disposition: home    Marisa Alaniz RN

## 2024-03-21 NOTE — PLAN OF CARE
Patient had surgery for his BPH with urinary obstruction yesterday.  Patient has CBI running moderately.  Patient is hard of hearing even with his Hearing aides.  He needs encouragement with his incentive spirometer.  He is alert and oriented.  No other complaints at this time.  Plan of care ongoing.        Siderails up x 2  Hourly rounding  Call light in reach  Instructed to call for assist before attempting out of bed.  Remains free from falls and accidental injury at this time   Floor free from obstacles  Bed is locked and in lowest position  Adequate lighting provided  Bed alarm on, Red Falling star and Stay with Me signs posted      Problem: Safety - Adult  Goal: Free from fall injury  Outcome: Progressing     Pain level assessment complete.   Patient educated on pain scale and control interventions  PRN pain medication given per patient request  Patient instructed to call out with new onset of pain or unrelieved pain    Problem: Pain  Goal: Verbalizes/displays adequate comfort level or baseline comfort level  Outcome: Progressing     Problem: Discharge Planning  Goal: Discharge to home or other facility with appropriate resources  Outcome: Progressing  Flowsheets (Taken 3/20/2024 1900)  Discharge to home or other facility with appropriate resources:   Identify barriers to discharge with patient and caregiver   Arrange for needed discharge resources and transportation as appropriate   Identify discharge learning needs (meds, wound care, etc)     Problem: Chronic Conditions and Co-morbidities  Goal: Patient's chronic conditions and co-morbidity symptoms are monitored and maintained or improved  Outcome: Progressing  Flowsheets (Taken 3/20/2024 1900)  Care Plan - Patient's Chronic Conditions and Co-Morbidity Symptoms are Monitored and Maintained or Improved: Monitor and assess patient's chronic conditions and comorbid symptoms for stability, deterioration, or improvement

## 2024-03-21 NOTE — PLAN OF CARE
Problem: Chronic Conditions and Co-morbidities  Goal: Patient's chronic conditions and co-morbidity symptoms are monitored and maintained or improved  3/21/2024 0953 by Batsheva Hurt RN  Outcome: Adequate for Discharge  Flowsheets (Taken 3/21/2024 0819 by Tashia Martinez, RN)  Care Plan - Patient's Chronic Conditions and Co-Morbidity Symptoms are Monitored and Maintained or Improved:   Monitor and assess patient's chronic conditions and comorbid symptoms for stability, deterioration, or improvement   Collaborate with multidisciplinary team to address chronic and comorbid conditions and prevent exacerbation or deterioration   Update acute care plan with appropriate goals if chronic or comorbid symptoms are exacerbated and prevent overall improvement and discharge  3/21/2024 0324 by Alexandra Bryson RN  Outcome: Progressing  Flowsheets (Taken 3/20/2024 1900)  Care Plan - Patient's Chronic Conditions and Co-Morbidity Symptoms are Monitored and Maintained or Improved: Monitor and assess patient's chronic conditions and comorbid symptoms for stability, deterioration, or improvement     Problem: Discharge Planning  Goal: Discharge to home or other facility with appropriate resources  3/21/2024 0953 by Batsheva Hurt RN  Outcome: Adequate for Discharge  Flowsheets (Taken 3/21/2024 0819 by Tashia Martinez, RN)  Discharge to home or other facility with appropriate resources:   Identify barriers to discharge with patient and caregiver   Arrange for needed discharge resources and transportation as appropriate   Identify discharge learning needs (meds, wound care, etc)   Refer to discharge planning if patient needs post-hospital services based on physician order or complex needs related to functional status, cognitive ability or social support system  3/21/2024 0324 by Alexandra Bryson, RN  Outcome: Progressing  Flowsheets (Taken 3/20/2024 1900)  Discharge to home or other facility with appropriate resources:   Identify  barriers to discharge with patient and caregiver   Arrange for needed discharge resources and transportation as appropriate   Identify discharge learning needs (meds, wound care, etc)     Problem: Safety - Adult  Goal: Free from fall injury  3/21/2024 0953 by Batsheva Hurt, MUMTAZ  Outcome: Adequate for Discharge  3/21/2024 0324 by Alexandra Bryson, RN  Outcome: Progressing     Problem: Pain  Goal: Verbalizes/displays adequate comfort level or baseline comfort level  3/21/2024 0953 by Batsheva Hurt, MUMTAZ  Outcome: Adequate for Discharge  3/21/2024 0324 by Alexandra Bryson, RN  Outcome: Progressing

## 2024-03-21 NOTE — PROGRESS NOTES
Urology Progress Note    Subjective: Urine clear.    Patient Vitals for the past 24 hrs:   BP Temp Temp src Pulse Resp SpO2 Height Weight   03/21/24 0413 106/83 98.2 °F (36.8 °C) Oral 77 17 94 % -- --   03/21/24 0332 -- -- -- -- -- -- -- 99.8 kg (220 lb)   03/21/24 0111 110/81 98.2 °F (36.8 °C) Oral 80 18 94 % -- --   03/20/24 1859 (!) 151/92 97.5 °F (36.4 °C) Oral 83 14 94 % -- --   03/20/24 1345 (!) 153/99 -- -- 83 -- 94 % -- --   03/20/24 1300 (!) 146/76 -- -- 73 12 97 % -- --   03/20/24 1245 (!) 142/84 -- -- 63 12 97 % -- --   03/20/24 1230 (!) 148/99 -- -- 61 10 96 % -- --   03/20/24 1215 (!) 154/98 -- -- 65 10 96 % -- --   03/20/24 1200 (!) 163/95 -- -- 67 12 96 % -- --   03/20/24 1145 (!) 160/92 -- -- 79 16 99 % -- --   03/20/24 1131 (!) 158/99 97.3 °F (36.3 °C) Temporal 82 15 100 % -- --   03/20/24 0806 (!) 132/99 97.3 °F (36.3 °C) -- 82 18 94 % 1.676 m (5' 6\") 98 kg (216 lb)       Intake/Output Summary (Last 24 hours) at 3/21/2024 0733  Last data filed at 3/21/2024 0612  Gross per 24 hour   Intake 900 ml   Output 1925 ml   Net -1025 ml       No results for input(s): \"WBC\", \"HGB\", \"HCT\", \"MCV\", \"PLT\" in the last 72 hours.  No results for input(s): \"NA\", \"K\", \"CL\", \"CO2\", \"PHOS\", \"BUN\", \"CREATININE\", \"CA\" in the last 72 hours.    No results for input(s): \"COLORU\", \"PHUR\", \"LABCAST\", \"WBCUA\", \"RBCUA\", \"MUCUS\", \"TRICHOMONAS\", \"YEAST\", \"BACTERIA\", \"CLARITYU\", \"SPECGRAV\", \"LEUKOCYTESUR\", \"UROBILINOGEN\", \"BILIRUBINUR\", \"BLOODU\" in the last 72 hours.    Invalid input(s): \"NITRATE\", \"GLUCOSEUKETONESUAMORPHOUS\"    Additional Lab/culture results:    Physical Exam: soft, nontender, nondistended, no masses or organomegaly normal circumcised penis    Interval Imaging Findings:    Impression: BPH    Patient Active Problem List   Diagnosis    BPH with urinary obstruction       Plan: Remove sanchez.  Discharge.    Panfilo URENA MD  7:33 AM 3/21/2024

## 2024-03-21 NOTE — PROGRESS NOTES
Dr Ugarte to the patient room; plan of care discussed with the patient. Fernandes to be removed; patient may be discharged home after he voids.

## 2024-03-21 NOTE — PROGRESS NOTES
Pt discharged to home with belongings.  Discharge instructions given to patient and wife.  Pt denies having any further questions at this time.  Locked up home medication(s)/personal items given to patient at discharge.  Patient/family state they have everything they were admitted with.

## 2024-03-21 NOTE — DISCHARGE INSTRUCTIONS
Stay hydrated  Notify Dr Bynum immediately with difficulty voiding or emptying bladder  Report increased bleeding or passing of large clots   Report fever immediately

## 2024-03-26 LAB — SURGICAL PATHOLOGY REPORT: NORMAL

## 2024-05-11 ENCOUNTER — APPOINTMENT (OUTPATIENT)
Dept: CT IMAGING | Age: 74
End: 2024-05-11
Payer: MEDICARE

## 2024-05-11 ENCOUNTER — HOSPITAL ENCOUNTER (EMERGENCY)
Age: 74
Discharge: HOME | End: 2024-05-12
Attending: STUDENT IN AN ORGANIZED HEALTH CARE EDUCATION/TRAINING PROGRAM
Payer: MEDICARE

## 2024-05-11 DIAGNOSIS — R30.0 DYSURIA: Primary | ICD-10-CM

## 2024-05-11 LAB
ALBUMIN SERPL-MCNC: 4.2 G/DL (ref 3.5–5.2)
ALP SERPL-CCNC: 78 U/L (ref 40–129)
ALT SERPL-CCNC: 34 U/L (ref 5–41)
ANION GAP SERPL CALCULATED.3IONS-SCNC: 12 MMOL/L (ref 9–17)
AST SERPL-CCNC: 56 U/L
BACTERIA URNS QL MICRO: ABNORMAL
BASOPHILS # BLD: 0.03 K/UL (ref 0–0.2)
BASOPHILS NFR BLD: 1 % (ref 0–2)
BILIRUB SERPL-MCNC: 0.3 MG/DL (ref 0.3–1.2)
BILIRUB UR QL STRIP: NEGATIVE
BUN SERPL-MCNC: 23 MG/DL (ref 8–23)
BUN/CREAT SERPL: 18 (ref 9–20)
CALCIUM SERPL-MCNC: 9 MG/DL (ref 8.6–10.4)
CHLORIDE SERPL-SCNC: 105 MMOL/L (ref 98–107)
CLARITY UR: CLEAR
CO2 SERPL-SCNC: 26 MMOL/L (ref 20–31)
COLOR UR: YELLOW
CREAT SERPL-MCNC: 1.3 MG/DL (ref 0.7–1.2)
EOSINOPHIL # BLD: 0.15 K/UL (ref 0–0.44)
EOSINOPHILS RELATIVE PERCENT: 3 % (ref 1–4)
EPI CELLS #/AREA URNS HPF: ABNORMAL /HPF (ref 0–5)
ERYTHROCYTE [DISTWIDTH] IN BLOOD BY AUTOMATED COUNT: 13.4 % (ref 11.8–14.4)
GFR, ESTIMATED: 58 ML/MIN/1.73M2
GLUCOSE SERPL-MCNC: 159 MG/DL (ref 70–99)
GLUCOSE UR STRIP-MCNC: NEGATIVE MG/DL
HCT VFR BLD AUTO: 38.3 % (ref 40.7–50.3)
HGB BLD-MCNC: 12.4 G/DL (ref 13–17)
HGB UR QL STRIP.AUTO: ABNORMAL
IMM GRANULOCYTES # BLD AUTO: 0.02 K/UL (ref 0–0.3)
IMM GRANULOCYTES NFR BLD: 0 %
KETONES UR STRIP-MCNC: ABNORMAL MG/DL
LEUKOCYTE ESTERASE UR QL STRIP: ABNORMAL
LIPASE SERPL-CCNC: 48 U/L (ref 13–60)
LYMPHOCYTES NFR BLD: 1.3 K/UL (ref 1.1–3.7)
LYMPHOCYTES RELATIVE PERCENT: 23 % (ref 24–43)
MCH RBC QN AUTO: 28.8 PG (ref 25.2–33.5)
MCHC RBC AUTO-ENTMCNC: 32.4 G/DL (ref 28.4–34.8)
MCV RBC AUTO: 89.1 FL (ref 82.6–102.9)
MONOCYTES NFR BLD: 0.56 K/UL (ref 0.1–1.2)
MONOCYTES NFR BLD: 10 % (ref 3–12)
MUCOUS THREADS URNS QL MICRO: ABNORMAL
NEUTROPHILS NFR BLD: 63 % (ref 36–65)
NEUTS SEG NFR BLD: 3.56 K/UL (ref 1.5–8.1)
NITRITE UR QL STRIP: NEGATIVE
NRBC BLD-RTO: 0 PER 100 WBC
PH UR STRIP: 6 [PH] (ref 5–8)
PLATELET # BLD AUTO: 174 K/UL (ref 138–453)
PMV BLD AUTO: 10.9 FL (ref 8.1–13.5)
POTASSIUM SERPL-SCNC: 3.9 MMOL/L (ref 3.7–5.3)
PROT SERPL-MCNC: 7.6 G/DL (ref 6.4–8.3)
PROT UR STRIP-MCNC: ABNORMAL MG/DL
RBC # BLD AUTO: 4.3 M/UL (ref 4.21–5.77)
RBC #/AREA URNS HPF: ABNORMAL /HPF (ref 0–2)
SODIUM SERPL-SCNC: 143 MMOL/L (ref 135–144)
SP GR UR STRIP: 1.02 (ref 1–1.03)
UROBILINOGEN UR STRIP-ACNC: NORMAL EU/DL (ref 0–1)
WBC #/AREA URNS HPF: ABNORMAL /HPF (ref 0–5)
WBC OTHER # BLD: 5.6 K/UL (ref 3.5–11.3)

## 2024-05-11 PROCEDURE — 74177 CT ABD & PELVIS W/CONTRAST: CPT

## 2024-05-11 PROCEDURE — 80053 COMPREHEN METABOLIC PANEL: CPT

## 2024-05-11 PROCEDURE — 81001 URINALYSIS AUTO W/SCOPE: CPT

## 2024-05-11 PROCEDURE — 99285 EMERGENCY DEPT VISIT HI MDM: CPT

## 2024-05-11 PROCEDURE — 6360000004 HC RX CONTRAST MEDICATION: Performed by: STUDENT IN AN ORGANIZED HEALTH CARE EDUCATION/TRAINING PROGRAM

## 2024-05-11 PROCEDURE — 2580000003 HC RX 258: Performed by: STUDENT IN AN ORGANIZED HEALTH CARE EDUCATION/TRAINING PROGRAM

## 2024-05-11 PROCEDURE — 87186 SC STD MICRODIL/AGAR DIL: CPT

## 2024-05-11 PROCEDURE — 85025 COMPLETE CBC W/AUTO DIFF WBC: CPT

## 2024-05-11 PROCEDURE — 87086 URINE CULTURE/COLONY COUNT: CPT

## 2024-05-11 PROCEDURE — 87077 CULTURE AEROBIC IDENTIFY: CPT

## 2024-05-11 PROCEDURE — 83690 ASSAY OF LIPASE: CPT

## 2024-05-11 RX ORDER — SODIUM CHLORIDE 0.9 % (FLUSH) 0.9 %
10 SYRINGE (ML) INJECTION PRN
Status: DISCONTINUED | OUTPATIENT
Start: 2024-05-11 | End: 2024-05-12 | Stop reason: HOSPADM

## 2024-05-11 RX ORDER — 0.9 % SODIUM CHLORIDE 0.9 %
80 INTRAVENOUS SOLUTION INTRAVENOUS ONCE
Status: COMPLETED | OUTPATIENT
Start: 2024-05-11 | End: 2024-05-11

## 2024-05-11 RX ORDER — 0.9 % SODIUM CHLORIDE 0.9 %
1000 INTRAVENOUS SOLUTION INTRAVENOUS ONCE
Status: COMPLETED | OUTPATIENT
Start: 2024-05-11 | End: 2024-05-12

## 2024-05-11 RX ADMIN — SODIUM CHLORIDE, PRESERVATIVE FREE 10 ML: 5 INJECTION INTRAVENOUS at 23:40

## 2024-05-11 RX ADMIN — SODIUM CHLORIDE 80 ML: 9 INJECTION, SOLUTION INTRAVENOUS at 23:39

## 2024-05-11 RX ADMIN — IOPAMIDOL 75 ML: 755 INJECTION, SOLUTION INTRAVENOUS at 23:39

## 2024-05-12 VITALS
TEMPERATURE: 98.6 F | DIASTOLIC BLOOD PRESSURE: 88 MMHG | RESPIRATION RATE: 18 BRPM | WEIGHT: 212 LBS | SYSTOLIC BLOOD PRESSURE: 161 MMHG | HEART RATE: 79 BPM | OXYGEN SATURATION: 98 % | BODY MASS INDEX: 34.07 KG/M2 | HEIGHT: 66 IN

## 2024-05-12 PROCEDURE — 2580000003 HC RX 258: Performed by: STUDENT IN AN ORGANIZED HEALTH CARE EDUCATION/TRAINING PROGRAM

## 2024-05-12 RX ORDER — PHENAZOPYRIDINE HYDROCHLORIDE 100 MG/1
100 TABLET, FILM COATED ORAL 3 TIMES DAILY PRN
Qty: 12 TABLET | Refills: 0 | Status: SHIPPED | OUTPATIENT
Start: 2024-05-12 | End: 2024-05-17

## 2024-05-12 RX ORDER — PHENAZOPYRIDINE HYDROCHLORIDE 100 MG/1
100 TABLET, FILM COATED ORAL 3 TIMES DAILY PRN
Qty: 12 TABLET | Refills: 0 | Status: SHIPPED | OUTPATIENT
Start: 2024-05-12 | End: 2024-05-12

## 2024-05-12 RX ADMIN — SODIUM CHLORIDE 1000 ML: 9 INJECTION, SOLUTION INTRAVENOUS at 00:00

## 2024-05-12 NOTE — DISCHARGE INSTRUCTIONS
For pain use acetaminophen (Tylenol) or ibuprofen (Motrin / Advil), unless prescribed medications that have acetaminophen or ibuprofen (or similar medications) in it.  You can take over the counter acetaminophen tablets (1 - 2 tablets of the 500-mg strength every 6 hours) or ibuprofen tablets (2 tablets every 4 hours).    PLEASE RETURN TO THE EMERGENCY DEPARTMENT IMMEDIATELY for worsening symptoms, inability to urinate, worsening of blood in your urine, or if you develop any concerning symptoms such as: high fever not relieved by acetaminophen (Tylenol) and/or ibuprofen (Motrin / Advil), chills, shortness of breath, chest pain, feeling of your heart fluttering or racing, persistent nausea and/or vomiting, vomiting up blood, blood in your stool, loss of consciousness, numbness, weakness or tingling in the arms or legs or change in color of the extremities, changes in mental status, persistent headache, blurry vision, loss of bladder / bowel.

## 2024-05-14 LAB
MICROORGANISM SPEC CULT: ABNORMAL
SPECIMEN DESCRIPTION: ABNORMAL

## 2024-05-15 NOTE — ED PROVIDER NOTES
[facility] EMERGENCY DEPARTMENT ENCOUNTER      Pt Name: Duane Ramsey  MRN: [id number]  Birthdate 1950  Date of evaluation: 5/14/24    CHIEF COMPLAINT       Chief Complaint   Patient presents with    Dysuria     Px arrives complaining of pain/difficulty urinating that started about 2 weeks. Px states urologist put him on ATB (px states ATB did not help). Px had turp procedure performed in March.        HISTORY OF PRESENT ILLNESS   Duane Ramsey is a 73 y.o. male who presents with dysuria increased frequency.  Pain is intermittent.  Improved currently.  Had TURP performed by Dr. Ugarte 3/20.  States he has had issues with this urination issue ever since.  Recent course of antibiotics with minimal improvement.  Also was recently placed on Gemtesa also with minimal improvement.    PASTMEDICAL HISTORY     Past Medical History:   Diagnosis Date    Anxiety and depression     Deviated septum     ENT says not bad enough for surgery at this time.    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

## 2024-05-15 NOTE — ED NOTES
[facility] Follow-Up to Outside Facility/Department:    Patient had positive Urine Culture Result(s) for  Staphylococcus simulans  from recent ED encounter.   Facility Contacted: Outpatient Specialist Provider: Dr. Ugarte  Confirmed Fax Number and Culture Result with Nursing Staff: Yes  Name of Staff Member Contacted: Radha  Fax Number Receiving Results: [phone]    No further action is required from [facility] ED at this time. The culture results have been relayed to the health care team directing this patient's continued care.

## 2025-04-06 ENCOUNTER — APPOINTMENT (OUTPATIENT)
Dept: CT IMAGING | Age: 75
End: 2025-04-06
Payer: MEDICARE

## 2025-04-06 ENCOUNTER — HOSPITAL ENCOUNTER (EMERGENCY)
Age: 75
Discharge: HOME | End: 2025-04-06
Attending: EMERGENCY MEDICINE
Payer: MEDICARE

## 2025-04-06 VITALS
DIASTOLIC BLOOD PRESSURE: 89 MMHG | RESPIRATION RATE: 14 BRPM | TEMPERATURE: 97.9 F | SYSTOLIC BLOOD PRESSURE: 145 MMHG | HEART RATE: 84 BPM | WEIGHT: 212 LBS | BODY MASS INDEX: 34.22 KG/M2 | OXYGEN SATURATION: 94 %

## 2025-04-06 DIAGNOSIS — N17.9 AKI (ACUTE KIDNEY INJURY): ICD-10-CM

## 2025-04-06 DIAGNOSIS — I71.40 ABDOMINAL ANEURYSM: ICD-10-CM

## 2025-04-06 DIAGNOSIS — N20.0 KIDNEY STONE: Primary | ICD-10-CM

## 2025-04-06 DIAGNOSIS — N13.30 HYDRONEPHROSIS OF LEFT KIDNEY: ICD-10-CM

## 2025-04-06 LAB
ALBUMIN SERPL-MCNC: 4.3 G/DL (ref 3.5–5.2)
ALBUMIN/GLOB SERPL: 1.1 {RATIO} (ref 1–2.5)
ALP SERPL-CCNC: 83 U/L (ref 40–129)
ALT SERPL-CCNC: 34 U/L (ref 10–50)
ANION GAP SERPL CALCULATED.3IONS-SCNC: 18 MMOL/L (ref 9–16)
AST SERPL-CCNC: 55 U/L (ref 10–50)
BASOPHILS # BLD: <0.03 K/UL (ref 0–0.2)
BASOPHILS NFR BLD: 0 % (ref 0–2)
BILIRUB SERPL-MCNC: 0.6 MG/DL (ref 0–1.2)
BILIRUB UR QL STRIP: NEGATIVE
BUN SERPL-MCNC: 25 MG/DL (ref 8–23)
CALCIUM SERPL-MCNC: 11 MG/DL (ref 8.8–10.2)
CHLORIDE SERPL-SCNC: 96 MMOL/L (ref 98–107)
CLARITY UR: CLEAR
CO2 SERPL-SCNC: 25 MMOL/L (ref 20–31)
COLOR UR: YELLOW
CREAT SERPL-MCNC: 1.8 MG/DL (ref 0.7–1.2)
EOSINOPHIL # BLD: 0.03 K/UL (ref 0–0.44)
EOSINOPHILS RELATIVE PERCENT: 0 % (ref 1–4)
EPI CELLS #/AREA URNS HPF: NORMAL /HPF (ref 0–5)
ERYTHROCYTE [DISTWIDTH] IN BLOOD BY AUTOMATED COUNT: 12.8 % (ref 11.8–14.4)
GFR, ESTIMATED: 40 ML/MIN/1.73M2
GLUCOSE SERPL-MCNC: 154 MG/DL (ref 82–115)
GLUCOSE UR STRIP-MCNC: NEGATIVE MG/DL
HCT VFR BLD AUTO: 42.1 % (ref 40.7–50.3)
HGB BLD-MCNC: 14 G/DL (ref 13–17)
HGB UR QL STRIP.AUTO: ABNORMAL
IMM GRANULOCYTES # BLD AUTO: 0.02 K/UL (ref 0–0.3)
IMM GRANULOCYTES NFR BLD: 0 %
KETONES UR STRIP-MCNC: NEGATIVE MG/DL
LEUKOCYTE ESTERASE UR QL STRIP: NEGATIVE
LYMPHOCYTES NFR BLD: 1.12 K/UL (ref 1.1–3.7)
LYMPHOCYTES RELATIVE PERCENT: 12 % (ref 24–43)
MCH RBC QN AUTO: 29.5 PG (ref 25.2–33.5)
MCHC RBC AUTO-ENTMCNC: 33.3 G/DL (ref 28.4–34.8)
MCV RBC AUTO: 88.6 FL (ref 82.6–102.9)
MONOCYTES NFR BLD: 0.79 K/UL (ref 0.1–1.2)
MONOCYTES NFR BLD: 8 % (ref 3–12)
NEUTROPHILS NFR BLD: 80 % (ref 36–65)
NEUTS SEG NFR BLD: 7.42 K/UL (ref 1.5–8.1)
NITRITE UR QL STRIP: NEGATIVE
NRBC BLD-RTO: 0 PER 100 WBC
PH UR STRIP: 6 [PH] (ref 5–8)
PLATELET # BLD AUTO: 184 K/UL (ref 138–453)
PMV BLD AUTO: 10.5 FL (ref 8.1–13.5)
POTASSIUM SERPL-SCNC: 4.1 MMOL/L (ref 3.7–5.3)
PROT SERPL-MCNC: 8.1 G/DL (ref 6.6–8.7)
PROT UR STRIP-MCNC: ABNORMAL MG/DL
RBC # BLD AUTO: 4.75 M/UL (ref 4.21–5.77)
RBC #/AREA URNS HPF: NORMAL /HPF (ref 0–2)
SODIUM SERPL-SCNC: 138 MMOL/L (ref 136–145)
SP GR UR STRIP: 1.02 (ref 1–1.03)
UROBILINOGEN UR STRIP-ACNC: NORMAL EU/DL (ref 0–1)
WBC #/AREA URNS HPF: NORMAL /HPF (ref 0–5)
WBC OTHER # BLD: 9.4 K/UL (ref 3.5–11.3)

## 2025-04-06 PROCEDURE — 74176 CT ABD & PELVIS W/O CONTRAST: CPT

## 2025-04-06 PROCEDURE — 99284 EMERGENCY DEPT VISIT MOD MDM: CPT

## 2025-04-06 PROCEDURE — 80053 COMPREHEN METABOLIC PANEL: CPT

## 2025-04-06 PROCEDURE — 6360000002 HC RX W HCPCS: Performed by: EMERGENCY MEDICINE

## 2025-04-06 PROCEDURE — 96374 THER/PROPH/DIAG INJ IV PUSH: CPT

## 2025-04-06 PROCEDURE — 2580000003 HC RX 258: Performed by: EMERGENCY MEDICINE

## 2025-04-06 PROCEDURE — 81001 URINALYSIS AUTO W/SCOPE: CPT

## 2025-04-06 PROCEDURE — 85025 COMPLETE CBC W/AUTO DIFF WBC: CPT

## 2025-04-06 RX ORDER — TAMSULOSIN HYDROCHLORIDE 0.4 MG/1
0.4 CAPSULE ORAL DAILY
Qty: 10 CAPSULE | Refills: 0 | Status: SHIPPED | OUTPATIENT
Start: 2025-04-06 | End: 2025-04-16

## 2025-04-06 RX ORDER — 0.9 % SODIUM CHLORIDE 0.9 %
500 INTRAVENOUS SOLUTION INTRAVENOUS ONCE
Status: COMPLETED | OUTPATIENT
Start: 2025-04-06 | End: 2025-04-06

## 2025-04-06 RX ORDER — KETOROLAC TROMETHAMINE 15 MG/ML
15 INJECTION, SOLUTION INTRAMUSCULAR; INTRAVENOUS ONCE
Status: COMPLETED | OUTPATIENT
Start: 2025-04-06 | End: 2025-04-06

## 2025-04-06 RX ORDER — KETOROLAC TROMETHAMINE 10 MG/1
10 TABLET, FILM COATED ORAL EVERY 6 HOURS PRN
Qty: 20 TABLET | Refills: 0 | Status: SHIPPED | OUTPATIENT
Start: 2025-04-06

## 2025-04-06 RX ADMIN — SODIUM CHLORIDE 500 ML: 0.9 INJECTION, SOLUTION INTRAVENOUS at 14:03

## 2025-04-06 RX ADMIN — KETOROLAC TROMETHAMINE 15 MG: 15 INJECTION, SOLUTION INTRAMUSCULAR; INTRAVENOUS at 14:04

## 2025-04-06 ASSESSMENT — PAIN DESCRIPTION - LOCATION: LOCATION: ABDOMEN

## 2025-04-06 ASSESSMENT — PAIN SCALES - GENERAL
PAINLEVEL_OUTOF10: 6
PAINLEVEL_OUTOF10: 8

## 2025-04-06 ASSESSMENT — PAIN - FUNCTIONAL ASSESSMENT: PAIN_FUNCTIONAL_ASSESSMENT: 0-10

## 2025-04-06 NOTE — ED PROVIDER NOTES
(*)     All other components within normal limits   MICROSCOPIC URINALYSIS       Vitals Reviewed:    Vitals:    04/06/25 1322   BP: (!) 167/94   Pulse: 84   Resp: 14   Temp: 97.9 °F (36.6 °C)   SpO2: 96%   Weight: 96.2 kg (212 lb)     MEDICATIONS GIVEN TO PATIENT THIS ENCOUNTER:  Orders Placed This Encounter   Medications    ketorolac (TORADOL) injection 15 mg    sodium chloride 0.9 % bolus 500 mL    tamsulosin (FLOMAX) 0.4 MG capsule     Sig: Take 1 capsule by mouth daily for 10 days     Dispense:  10 capsule     Refill:  0    ketorolac (TORADOL) 10 MG tablet     Sig: Take 1 tablet by mouth every 6 hours as needed for Pain     Dispense:  20 tablet     Refill:  0     DISCHARGE PRESCRIPTIONS:  New Prescriptions    KETOROLAC (TORADOL) 10 MG TABLET    Take 1 tablet by mouth every 6 hours as needed for Pain    TAMSULOSIN (FLOMAX) 0.4 MG CAPSULE    Take 1 capsule by mouth daily for 10 days     PHYSICIAN CONSULTS ORDERED THIS ENCOUNTER:  None  FINAL IMPRESSION      1. Kidney stone    2. Hydronephrosis of left kidney    3. MARIO (acute kidney injury)    4. Abdominal aneurysm          DISPOSITION/PLAN   DISPOSITION   discharge  DISPOSITION CONDITION Stable           OUTPATIENT FOLLOW UP THE PATIENT:  Bruce Khan MD  [address] [address]  [phone]    Call       [facility] Emergency Department  [address] [address]  [phone]  Go to   If symptoms worsen    Luis Enrique Bynum MD  [address] [facility] [address] [phone]            Lamont Karimi MD        This note was created with the assistance of a speech-recognition program. While intending to generate a document that actually reflects the content of the visit, no guarantees can be provided that every mistake has been identified and corrected by editing.       Lamont Karimi MD  04/06/25 1600

## 2025-04-06 NOTE — DISCHARGE INSTRUCTIONS
Please follow-up with urology.  Start taking Flomax.  I recommend taking it at as it may lower your blood pressure and make you feel dizzy.  You may also take ibuprofen or Tylenol for mild to moderate pain and Toradol for breakthrough pain.  Be careful while taking Toradol because it can affect your kidney function as well.  You also have a stable 3.2 cm abdominal aneurysm.  Radiologist recommends following up with ultrasound in 3 years.  Follow-up with your primary care physician within 2 to 3 days to discuss your ER visit and return back to the emergency department immediately if you notice any concerning or worsening signs or symptoms.

## (undated) DEVICE — SYRINGE MED 30ML STD CLR PLAS LUERLOCK TIP N CTRL DISP

## (undated) DEVICE — CATHETER FOL 24 FR 30 CC 3 W HYDRGEL COAT DOVER

## (undated) DEVICE — TUR/ENDOSCOPIC CABLE, 10' (3.05 M): Brand: CONMED

## (undated) DEVICE — STAZ CYSTO: Brand: MEDLINE INDUSTRIES, INC.

## (undated) DEVICE — CONTAINER,SPECIMEN,OR STERILE,4OZ: Brand: MEDLINE

## (undated) DEVICE — Y-TYPE TUR/BLADDER IRRIGATION SET, REGULATING CLAMP

## (undated) DEVICE — STRAP,CATHETER,ELASTIC,HOOK&LOOP: Brand: MEDLINE

## (undated) DEVICE — BAG,LEG,COMFORT-STRAPS,LARGE,32OZ: Brand: MEDLINE

## (undated) DEVICE — SOLUTION IRRIG 3000ML 1.5% GL USP UROMATIC CONT

## (undated) DEVICE — GLOVE SURG SZ 65 CRM LTX FREE POLYISOPRENE POLYMER BEAD ANTI

## (undated) DEVICE — BAG,DRAINAGE,4L,A/R TOWER,METAL CLAMP: Brand: MEDLINE

## (undated) DEVICE — CATHETER,FOLEY,3WAY,SILI-ELAST,22FR,30ML: Brand: MEDLINE